# Patient Record
Sex: MALE | Race: WHITE | NOT HISPANIC OR LATINO | Employment: OTHER | URBAN - METROPOLITAN AREA
[De-identification: names, ages, dates, MRNs, and addresses within clinical notes are randomized per-mention and may not be internally consistent; named-entity substitution may affect disease eponyms.]

---

## 2024-03-06 DIAGNOSIS — M25.559 HIP PAIN, UNSPECIFIED LATERALITY: Primary | ICD-10-CM

## 2024-03-07 ENCOUNTER — OFFICE VISIT (OUTPATIENT)
Dept: ORTHOPEDICS | Facility: CLINIC | Age: 84
End: 2024-03-07
Payer: MEDICARE

## 2024-03-07 VITALS — WEIGHT: 202 LBS | BODY MASS INDEX: 31.71 KG/M2 | HEIGHT: 67 IN

## 2024-03-07 DIAGNOSIS — M16.11 PRIMARY OSTEOARTHRITIS OF RIGHT HIP: Primary | ICD-10-CM

## 2024-03-07 DIAGNOSIS — M16.12 PRIMARY OSTEOARTHRITIS OF LEFT HIP: ICD-10-CM

## 2024-03-07 DIAGNOSIS — M16.11 PRIMARY LOCALIZED OSTEOARTHRITIS OF RIGHT HIP: Primary | ICD-10-CM

## 2024-03-07 PROCEDURE — 99999 PR PBB SHADOW E&M-EST. PATIENT-LVL III: CPT | Mod: PBBFAC,,, | Performed by: ORTHOPAEDIC SURGERY

## 2024-03-07 PROCEDURE — 1125F AMNT PAIN NOTED PAIN PRSNT: CPT | Mod: CPTII,S$GLB,, | Performed by: ORTHOPAEDIC SURGERY

## 2024-03-07 PROCEDURE — 99204 OFFICE O/P NEW MOD 45 MIN: CPT | Mod: S$GLB,,, | Performed by: ORTHOPAEDIC SURGERY

## 2024-03-07 PROCEDURE — 1159F MED LIST DOCD IN RCRD: CPT | Mod: CPTII,S$GLB,, | Performed by: ORTHOPAEDIC SURGERY

## 2024-03-07 NOTE — PROGRESS NOTES
NEW PATIENT ORTHOPAEDIC: HIP    PRIMARY CARE PHYSICIAN: Kalin Cobos MD   REFERRING PROVIDER: Self, Aaareferral  No address on file     ASSESSMENT & PLAN:    Impression:  Bilateral Hip Severe Degenerative Osteoarthritis, Primary (right worse than left)    Follow Up Plan: 3 weeks after surgery     Surgery:     Michael Lennon has radiograph and physical exam evidence of the aforementioned impression and wishes to pursue surgery. This patient appears to have sufficient symptoms to warrant surgical intervention and is an appropriate candidate for right Primary Total Hip Arthroplasty as evidenced by months of unsuccessful non-operative treatment as outlined in the HPI below and progressive symptoms.    We had a lengthy discussion regarding the risk and benefit of surgery, the alternatives, limitations and personnel involved. These included but were not limited to infection, persistent pain, instability, nerve injury, blood clots, dislocation, loosening, leg length inequality and medical complications. We also discussed the pre-operative course, surgery itself and rehabilitation.    Keyla-operative blood management and transfusion issues were discussed, and options clearly outlined. The patient has consented to the use of the banked allogenic blood if medically necessary.    The patient has elected to schedule surgery at this time or intends to call the office with a surgical date. Shared decision making occurred while obtaining informed consent. The patient will be scheduled for a pre-operative education class at which time they will have their nasal swab completed and will be given CHG cloths along with the verbal and written instructions for their use.    We will plan for use of Jf Accolade 2/Trident 2 components, with JONEL assistance.     Patient is on Eloquis for Afib.     Tentative Surgery Date: 5/1/24      The patient has been ordered:  CT ([facility] Protocol)    CONSULTS:   Internal Medicine Consult for  preoperative clearance.  Cardiology Consult for cardiac clearance for cardiac problem.  Anesthesia for pre-surgical optimization    ACTIVE PROBLEM LIST  There is no problem list on file for this patient.          SUBJECTIVE    CHIEF COMPLAINT: Hip Pain    HPI:   Michael Lennon is a 83 y.o. male here for evaluation and management of bilateral hip pain. There is not a specific incident that brought about this pain. he has had progressive problems with the hip(s) starting 10 years ago and is progressing which is now interfering with activities which include: walking, functional household ADL's, participating in family activities, enjoying hobbies, rising from a sitting position, standing for prolonged periods of time, and getting in and out of a car    Currently the pain in the joint is moderate with activity.  The pain is intermittent and is located in groin.  The pain is described as aching, severe, sharp, stiffness, and throbbing. Relieving factors include rest, ice or heat, over the counter medication , and repositioning. Yes associated Catching.     They do not report leg length inequality.     Michael Lennon has no additional complaints.     PROGRESSIVE SYMPTOMS:  Pain worsened by weight bearing  Pain effecting living situation  Pain limiting ability to stay fit and healthy    FUNCTIONAL STATUS:   Climb a flight of stairs or walk up a hill     PREVIOUS TREATMENTS:  Medical: OTC NSAIDS and Tylenol  Physical Therapy: Activities Modified   Previous Orthopaedic Surgery: b/l TKA    REVIEW OF SYSTEMS:  PAIN ASSESSMENT:  See HPI.  MUSCULOSKELETAL: See HPI.  OTHER 10 point review of systems is negative except as stated in HPI above    PAST MEDICAL HISTORY   has a past medical history of Hypertension.     PAST SURGICAL HISTORY   has a past surgical history that includes Knee surgery (2003); Knee surgery (2010); Appendectomy; and Disc removal (1970).     FAMILY HISTORY  family history is not on file.     SOCIAL HISTORY   reports  "that he has quit smoking. His smoking use included cigarettes. He started smoking about 48 years ago. He has never used smokeless tobacco. He reports current alcohol use. He reports that he does not use drugs.     ALLERGIES   Review of patient's allergies indicates:  No Known Allergies     MEDICATIONS   Current Outpatient Medications on File Prior to Visit   Medication Sig Dispense Refill    allopurinol (ZYLOPRIM) 100 MG tablet       amlodipine (NORVASC) 10 MG tablet       COLCRYS 0.6 mg tablet       ketoconazole (NIZORAL) 2 % shampoo       lisinopril (PRINIVIL,ZESTRIL) 40 MG tablet       metoprolol succinate (TOPROL-XL) 25 MG 24 hr tablet        No current facility-administered medications on file prior to visit.          PHYSICAL EXAM   height is 5' 7" (1.702 m) and weight is 91.6 kg (202 lb).   Body mass index is 31.64 kg/m².      All other systems deferred.  GENERAL:  No acute distress  HABITUS: Obese  GAIT: Antalgic  SKIN: Normal     HIP EXAM:    bilateral:   ROM:   Flexion: 120 degrees    Internal Rotation: 10 degrees    External Rotation: 20 degrees    Abduction: 45 degrees    Adduction: 20 degrees  No apparent leg length discrepancy    Palpation: No tenderness over greater trochanter, SI joint, ilioposas, IT band, gluteal musculature   Pain is reproduced with IR and ER of the hip  Strength: 5/5 hip flexion, abduction, knee flexion and extension   Straight leg raise: Negative   Neurovascular Status: Sensation intact to light touch in Sural, saphenous, SPN, DPN, Tibial nerve distribution  2+ pulse DP/PT, normal capillary refill, foot has normal warmth    DATA:  Diagnostic tests reviewed for today's visit:     AP pelvis, hip, and lateral radiographs shows bilateral severe narrowing of the superior joint space with sclerosis and osteophyte formation. The femoral neck is in varus position. The femoral head is flattened at superior margin. There is no signficant evidence of acetabular dysplasia and the femoral " head remains covered.

## 2024-04-08 ENCOUNTER — HOSPITAL ENCOUNTER (OUTPATIENT)
Dept: RADIOLOGY | Facility: HOSPITAL | Age: 84
Discharge: HOME | End: 2024-04-08
Attending: ORTHOPAEDIC SURGERY
Payer: MEDICARE

## 2024-04-08 ENCOUNTER — ANESTHESIA EVENT (OUTPATIENT)
Dept: SURGERY | Facility: HOSPITAL | Age: 84
End: 2024-04-08
Payer: MEDICARE

## 2024-04-08 DIAGNOSIS — M16.11 PRIMARY OSTEOARTHRITIS OF RIGHT HIP: ICD-10-CM

## 2024-04-08 PROCEDURE — 73700 CT LOWER EXTREMITY W/O DYE: CPT | Mod: 26,RT,, | Performed by: RADIOLOGY

## 2024-04-08 PROCEDURE — 73700 CT LOWER EXTREMITY W/O DYE: CPT | Mod: TC,RT

## 2024-04-17 ENCOUNTER — HOSPITAL ENCOUNTER (OUTPATIENT)
Dept: PREADMISSION TESTING | Facility: HOSPITAL | Age: 84
Discharge: HOME | End: 2024-04-17
Attending: ORTHOPAEDIC SURGERY
Payer: MEDICARE

## 2024-04-17 ENCOUNTER — HOSPITAL ENCOUNTER (OUTPATIENT)
Dept: RADIOLOGY | Facility: HOSPITAL | Age: 84
Discharge: HOME | End: 2024-04-17
Attending: ORTHOPAEDIC SURGERY
Payer: MEDICARE

## 2024-04-17 VITALS
HEIGHT: 67 IN | TEMPERATURE: 97 F | SYSTOLIC BLOOD PRESSURE: 123 MMHG | WEIGHT: 213.38 LBS | DIASTOLIC BLOOD PRESSURE: 60 MMHG | OXYGEN SATURATION: 98 % | HEART RATE: 60 BPM | BODY MASS INDEX: 33.49 KG/M2 | RESPIRATION RATE: 18 BRPM

## 2024-04-17 DIAGNOSIS — Z01.818 PREOPERATIVE TESTING: Primary | ICD-10-CM

## 2024-04-17 DIAGNOSIS — Z79.01 ANTICOAGULANT LONG-TERM USE: ICD-10-CM

## 2024-04-17 DIAGNOSIS — Z86.39 H/O: OBESITY: ICD-10-CM

## 2024-04-17 LAB
APTT PPP: 27.2 SEC (ref 21–32)
BILIRUB UR QL STRIP: NEGATIVE
CLARITY UR: CLEAR
COLOR UR: YELLOW
ESTIMATED AVG GLUCOSE: 117 MG/DL (ref 68–131)
GLUCOSE UR QL STRIP: NEGATIVE
HBA1C MFR BLD: 5.7 % (ref 4–5.6)
HGB UR QL STRIP: NEGATIVE
INR PPP: 1 (ref 0.8–1.2)
KETONES UR QL STRIP: NEGATIVE
LEUKOCYTE ESTERASE UR QL STRIP: NEGATIVE
NITRITE UR QL STRIP: NEGATIVE
PH UR STRIP: 6 [PH] (ref 5–8)
PROT UR QL STRIP: NEGATIVE
PROTHROMBIN TIME: 10.8 SEC (ref 9–12.5)
SP GR UR STRIP: 1.01 (ref 1–1.03)
URN SPEC COLLECT METH UR: NORMAL
UROBILINOGEN UR STRIP-ACNC: NEGATIVE EU/DL

## 2024-04-17 PROCEDURE — 36415 COLL VENOUS BLD VENIPUNCTURE: CPT | Performed by: ORTHOPAEDIC SURGERY

## 2024-04-17 PROCEDURE — 81003 URINALYSIS AUTO W/O SCOPE: CPT | Performed by: ORTHOPAEDIC SURGERY

## 2024-04-17 PROCEDURE — 83036 HEMOGLOBIN GLYCOSYLATED A1C: CPT | Performed by: ORTHOPAEDIC SURGERY

## 2024-04-17 PROCEDURE — 85730 THROMBOPLASTIN TIME PARTIAL: CPT | Performed by: ORTHOPAEDIC SURGERY

## 2024-04-17 PROCEDURE — 71046 X-RAY EXAM CHEST 2 VIEWS: CPT | Mod: 26,,, | Performed by: RADIOLOGY

## 2024-04-17 PROCEDURE — 85610 PROTHROMBIN TIME: CPT | Performed by: ORTHOPAEDIC SURGERY

## 2024-04-17 PROCEDURE — 71046 X-RAY EXAM CHEST 2 VIEWS: CPT | Mod: TC,FY

## 2024-04-17 RX ORDER — POTASSIUM CHLORIDE 750 MG/1
10 CAPSULE, EXTENDED RELEASE ORAL DAILY
COMMUNITY

## 2024-04-17 RX ORDER — COLCHICINE 0.6 MG/1
0.6 CAPSULE ORAL EVERY OTHER DAY
COMMUNITY
Start: 2024-04-06

## 2024-04-17 RX ORDER — CLONIDINE HYDROCHLORIDE 0.3 MG/1
0.3 TABLET ORAL 2 TIMES DAILY
COMMUNITY
Start: 2024-04-09

## 2024-04-17 RX ORDER — METFORMIN HYDROCHLORIDE 500 MG/1
500 TABLET ORAL NIGHTLY
COMMUNITY

## 2024-04-17 RX ORDER — APIXABAN 5 MG/1
5 TABLET, FILM COATED ORAL 2 TIMES DAILY
Status: ON HOLD | COMMUNITY
End: 2024-05-01 | Stop reason: HOSPADM

## 2024-04-17 RX ORDER — HYDROCHLOROTHIAZIDE 12.5 MG/1
12.5 CAPSULE ORAL DAILY
COMMUNITY
Start: 2024-04-05

## 2024-04-17 RX ORDER — FUROSEMIDE 20 MG/1
20 TABLET ORAL DAILY
COMMUNITY

## 2024-04-17 NOTE — DISCHARGE INSTRUCTIONS
YOUR PROCEDURE WILL BE AT [facility] at [address] [place] [address]                  Before 7 AM, enter through the Emergency Entrance..   After 7 AM enter through the Main Entrance.                 Report to the Same Day Surgery Registration Desk in the hallway.(Just beside the Same Day Surgery Unit)      Your procedure  is scheduled for _5/1/2024_________.    Call [phone] between 2pm and 5pm on _4/30/2024______to find out your arrival time for the day of surgery.    You may have two visitors.  No children under 12 years old.     You will be going to the Same Day Surgery Unit on the 2nd floor of the hospital.    Important instructions:  Do not eat anything after midnight.  You may have plain water, non carbonated.  You may also have Gatorade or Powerade after midnight.    Stop all fluids 2 hours before your surgery.    It is okay to brush your teeth.  Do not have gum, candy or mints.    SEE MEDICATION SHEET.   TAKE MEDICATIONS AS DIRECTED WITH SIPS OF WATER.      Do not take any diabetic medication on the morning of surgery unless instructed to do so by your doctor or pre op nurse.      All GLP-1 weekly diabetic/weight loss medications must not be taken for one week before your surgery, or your surgery could be canceled.      STOP taking Aspirin, Ibuprofen,  Advil, Motrin, Mobic(meloxicam), Aleve (naproxen), Fish oil, and Vitamin E for at least 7 days before your surgery.     You may take Tylenol if needed which is not a blood thinner.    Please shower the night before and the morning of your surgery.        Use Chlorhexidine soap as instructed by your pre op nurse.   Please place clean linens on your bed the night before surgery. Please wear fresh clean clothing after each shower.    No shaving of procedural area at least 4-5 days before surgery due to increased risk of skin irritation and/or possible infection.    Contact lenses and removable denture work may not be  worn during your procedure.    You may wear deodorant only. If you are having breast surgery, do not wear deodorant on the operative side.    Do not wear powder, body lotion, perfume/cologne or make-up.    Do not wear any jewelry or have any metal on your body.    You will be asked to remove any dentures or partials for the procedure.    If you are going home on the same day of surgery, you must arrange for a family member or a friend to drive you home.  Public transportation is prohibited.  You will not be able to drive home if you were given anesthesia or sedation.    Patients who want to have their Post-op prescriptions filled from our in-house [facility], bring a Credit/Debit Card or cash with you. A co-pay may be required.  The pharmacy closes at 5:30 pm.    Wear loose fitting clothes allowing for bandages.    Please leave money and valuables home.      You may bring your cell phone.    Call the doctor if fever or illness should occur before your surgery.    Call [phone] to contact us here if needed.                            CLOTHES ON DAY OF SURGERY    SHOULDER surgery:  you must have a very oversized shirt.  Very, Very large.  You will probably have a large sling on with your arm strapped to your chest.  You will not be able to put the arm of the operated shoulder into a sleeve.  You can put the arm of the un-operated shoulder into the sleeve, but the shirt will need to be draped over the operated shoulder.       ARM or HAND surgery:  make sure that your sleeves are large and loose enough to pass over large dressings or cast.      BREAST or UNDERARM surgery:  wear a loose, button down shirt so that you can dress without raising your arms over your head.    ABDOMINAL surgery:  wear loose, comfortable clothing.  Nothing tight around the abdomen.  NO JEANS    PENIS or SCROTAL surgery:  loose comfortable clothing.  Large sweat pants, pajama pants or a robe.  ABSOLUTELY NO JEANS      LEG or FOOT surgery:   wear large loose pants that are able to pass over any large dressings or casts.  You could also wear loose shorts or a skirt.

## 2024-04-17 NOTE — ANESTHESIA PREPROCEDURE EVALUATION
04/17/2024  Michael Lennon is a 83 y.o., male scheduled for ARTHROPLASTY, HIP, TOTAL, USING COMPUTER-ASSISTED NAVIGATION (Right: Hip) on 5/1/2024.      Past Medical History:   Diagnosis Date    Anticoagulant long-term use     Cancer     skin on face    Diabetes mellitus     Hypercholesteremia     Hypertension     Paroxysmal atrial fibrillation          Past Surgical History:   Procedure Laterality Date    APPENDECTOMY      DISC REMOVAL  1970    KNEE SURGERY  2003    left    KNEE SURGERY  2010    right           Pre-op Assessment    I have reviewed the Patient Summary Reports.     I have reviewed the Nursing Notes. I have reviewed the NPO Status.   I have reviewed the Medications.     Review of Systems  Anesthesia Hx:  No problems with previous Anesthesia             Denies Family Hx of Anesthesia complications.    Denies Personal Hx of Anesthesia complications.                    Social:  Former Smoker, Social Alcohol Use       Hematology/Oncology:  Hematology Normal   Oncology Normal                                   EENT/Dental:  EENT/Dental Normal           Cardiovascular:  Exercise tolerance: good   Hypertension    Dysrhythmias atrial fibrillation          EKG 1/30/24 (Care Everywhere):VENTRICULAR RATE BPM 67  ATRIAL RATE BPM 67  P-R INTERVAL ms 300  QRS DURATION ms 134  Q-T INTERVAL ms 458  QTC CALCULATION(BEZET) ms 483  P AXIS degrees 64  R AXIS degrees 48  T AXIS degrees 9  INTERPRETATION (MUSE)  Sinus rhythm with 1st degree AV block Right bundle branch block Abnormal ECG When compared with ECG of 24-MAY-2023 06:14, Significant changes have occurred Confirmed by DANYELLE MCCABE, HERRERA NEVES ([id number]) on 1/31/2024 2:50:18 PM     Functional Capacity good / => 4 METS                         Pulmonary:  Pulmonary Normal                       Renal/:  Renal/ Normal                 Hepatic/GI:  Hepatic/GI Normal                  Musculoskeletal:  Arthritis   R hip            Neurological:  Neurology Normal                                      Endocrine:  Diabetes, well controlled, type 2           Dermatological:  Skin Normal    Psych:  Psychiatric Normal                    Physical Exam  General: Well nourished, Cooperative, Alert and Oriented    Airway:  Mallampati: II / II  Mouth Opening: Normal  TM Distance: Normal  Tongue: Normal  Neck ROM: Normal ROM    Dental:  Intact    Chest/Lungs:  Clear to auscultation, Normal Respiratory Rate    Heart:  Rate: Normal  Rhythm: Regular Rhythm  Sounds: Normal        Anesthesia Plan  Type of Anesthesia, risks & benefits discussed:    Anesthesia Type: Spinal, Regional, Gen Supraglottic Airway, Gen ETT  Intra-op Monitoring Plan: Standard ASA Monitors  Induction:  IV  Airway Plan: Video and Direct, Post-Induction  Informed Consent: Informed consent signed with the Patient and all parties understand the risks and agree with anesthesia plan.  All questions answered.   ASA Score: 3  Day of Surgery Review of History & Physical: H&P Update referred to the surgeon/provider.  Anesthesia Plan Notes: Patient is medically cleared by PCP  Cleared by Cardiology (media)    Ready For Surgery From Anesthesia Perspective.     .

## 2024-04-30 ENCOUNTER — TELEPHONE (OUTPATIENT)
Dept: SURGERY | Facility: HOSPITAL | Age: 84
End: 2024-04-30
Payer: MEDICARE

## 2024-04-30 ENCOUNTER — LAB VISIT (OUTPATIENT)
Dept: LAB | Facility: HOSPITAL | Age: 84
End: 2024-04-30
Attending: ORTHOPAEDIC SURGERY
Payer: MEDICARE

## 2024-04-30 DIAGNOSIS — Z01.818 PREOPERATIVE TESTING: ICD-10-CM

## 2024-04-30 LAB
ABO + RH BLD: NORMAL
BLD GP AB SCN CELLS X3 SERPL QL: NORMAL
SPECIMEN OUTDATE: NORMAL

## 2024-04-30 PROCEDURE — 36415 COLL VENOUS BLD VENIPUNCTURE: CPT | Performed by: ORTHOPAEDIC SURGERY

## 2024-04-30 PROCEDURE — 86901 BLOOD TYPING SEROLOGIC RH(D): CPT | Performed by: ORTHOPAEDIC SURGERY

## 2024-05-01 ENCOUNTER — HOSPITAL ENCOUNTER (OUTPATIENT)
Facility: HOSPITAL | Age: 84
Discharge: HOME HEALTH | End: 2024-05-02
Attending: ORTHOPAEDIC SURGERY | Admitting: ORTHOPAEDIC SURGERY
Payer: MEDICARE

## 2024-05-01 ENCOUNTER — ANESTHESIA (OUTPATIENT)
Dept: SURGERY | Facility: HOSPITAL | Age: 84
End: 2024-05-01
Payer: MEDICARE

## 2024-05-01 DIAGNOSIS — M16.11 OSTEOARTHRITIS OF RIGHT HIP: ICD-10-CM

## 2024-05-01 DIAGNOSIS — Z01.818 PREOPERATIVE TESTING: ICD-10-CM

## 2024-05-01 DIAGNOSIS — M16.11 PRIMARY OSTEOARTHRITIS OF RIGHT HIP: Primary | ICD-10-CM

## 2024-05-01 LAB — POCT GLUCOSE: 145 MG/DL (ref 70–110)

## 2024-05-01 PROCEDURE — C1713 ANCHOR/SCREW BN/BN,TIS/BN: HCPCS | Performed by: ORTHOPAEDIC SURGERY

## 2024-05-01 PROCEDURE — 63600175 PHARM REV CODE 636 W HCPCS: Performed by: ANESTHESIOLOGY

## 2024-05-01 PROCEDURE — 25000003 PHARM REV CODE 250: Performed by: ORTHOPAEDIC SURGERY

## 2024-05-01 PROCEDURE — 27201423 OPTIME MED/SURG SUP & DEVICES STERILE SUPPLY: Performed by: ORTHOPAEDIC SURGERY

## 2024-05-01 PROCEDURE — C1776 JOINT DEVICE (IMPLANTABLE): HCPCS | Performed by: ORTHOPAEDIC SURGERY

## 2024-05-01 PROCEDURE — 63600175 PHARM REV CODE 636 W HCPCS: Performed by: ORTHOPAEDIC SURGERY

## 2024-05-01 PROCEDURE — 25000003 PHARM REV CODE 250: Performed by: STUDENT IN AN ORGANIZED HEALTH CARE EDUCATION/TRAINING PROGRAM

## 2024-05-01 PROCEDURE — 0055T BONE SRGRY CMPTR CT/MRI IMAG: CPT | Mod: ,,, | Performed by: ORTHOPAEDIC SURGERY

## 2024-05-01 PROCEDURE — 36000712 HC OR TIME LEV V 1ST 15 MIN: Performed by: ORTHOPAEDIC SURGERY

## 2024-05-01 PROCEDURE — 71000033 HC RECOVERY, INTIAL HOUR: Performed by: ORTHOPAEDIC SURGERY

## 2024-05-01 PROCEDURE — 63600175 PHARM REV CODE 636 W HCPCS: Performed by: STUDENT IN AN ORGANIZED HEALTH CARE EDUCATION/TRAINING PROGRAM

## 2024-05-01 PROCEDURE — 36000713 HC OR TIME LEV V EA ADD 15 MIN: Performed by: ORTHOPAEDIC SURGERY

## 2024-05-01 PROCEDURE — 25000003 PHARM REV CODE 250: Performed by: ANESTHESIOLOGY

## 2024-05-01 PROCEDURE — 27130 TOTAL HIP ARTHROPLASTY: CPT | Mod: RT,,, | Performed by: ORTHOPAEDIC SURGERY

## 2024-05-01 PROCEDURE — 37000009 HC ANESTHESIA EA ADD 15 MINS: Performed by: ORTHOPAEDIC SURGERY

## 2024-05-01 PROCEDURE — D9220A PRA ANESTHESIA: Mod: CRNA,,, | Performed by: STUDENT IN AN ORGANIZED HEALTH CARE EDUCATION/TRAINING PROGRAM

## 2024-05-01 PROCEDURE — 71000039 HC RECOVERY, EACH ADD'L HOUR: Performed by: ORTHOPAEDIC SURGERY

## 2024-05-01 PROCEDURE — D9220A PRA ANESTHESIA: Mod: ANES,,, | Performed by: ANESTHESIOLOGY

## 2024-05-01 PROCEDURE — 82962 GLUCOSE BLOOD TEST: CPT | Performed by: ORTHOPAEDIC SURGERY

## 2024-05-01 PROCEDURE — 37000008 HC ANESTHESIA 1ST 15 MINUTES: Performed by: ORTHOPAEDIC SURGERY

## 2024-05-01 DEVICE — SCREW TRIDENT II LP HEX 6.5X25: Type: IMPLANTABLE DEVICE | Site: HIP | Status: FUNCTIONAL

## 2024-05-01 DEVICE — INSERT TRIDENT X3 ID 36MM 0 DE: Type: IMPLANTABLE DEVICE | Site: HIP | Status: FUNCTIONAL

## 2024-05-01 DEVICE — HEAD V40 BIOLOX 36MM -2.5: Type: IMPLANTABLE DEVICE | Site: HIP | Status: FUNCTIONAL

## 2024-05-01 DEVICE — SHELL TRIDENT II ACET E 54MM: Type: IMPLANTABLE DEVICE | Site: HIP | Status: FUNCTIONAL

## 2024-05-01 DEVICE — STEM ACC II 27 DEG SZ 5: Type: IMPLANTABLE DEVICE | Site: HIP | Status: FUNCTIONAL

## 2024-05-01 RX ORDER — SODIUM CHLORIDE, SODIUM LACTATE, POTASSIUM CHLORIDE, CALCIUM CHLORIDE 600; 310; 30; 20 MG/100ML; MG/100ML; MG/100ML; MG/100ML
INJECTION, SOLUTION INTRAVENOUS CONTINUOUS
Status: DISCONTINUED | OUTPATIENT
Start: 2024-05-01 | End: 2024-05-02 | Stop reason: HOSPADM

## 2024-05-01 RX ORDER — HYDROMORPHONE HYDROCHLORIDE 2 MG/ML
0.2 INJECTION, SOLUTION INTRAMUSCULAR; INTRAVENOUS; SUBCUTANEOUS EVERY 5 MIN PRN
Status: DISCONTINUED | OUTPATIENT
Start: 2024-05-01 | End: 2024-05-01 | Stop reason: HOSPADM

## 2024-05-01 RX ORDER — DOCUSATE SODIUM 100 MG/1
100 CAPSULE, LIQUID FILLED ORAL 2 TIMES DAILY
Qty: 30 CAPSULE | Refills: 0 | Status: SHIPPED | OUTPATIENT
Start: 2024-05-01

## 2024-05-01 RX ORDER — DIPHENHYDRAMINE HYDROCHLORIDE 50 MG/ML
25 INJECTION INTRAMUSCULAR; INTRAVENOUS EVERY 6 HOURS PRN
Status: DISCONTINUED | OUTPATIENT
Start: 2024-05-01 | End: 2024-05-01 | Stop reason: HOSPADM

## 2024-05-01 RX ORDER — ACETAMINOPHEN 500 MG
1000 TABLET ORAL
Status: DISPENSED | OUTPATIENT
Start: 2024-05-01

## 2024-05-01 RX ORDER — FENTANYL CITRATE 50 UG/ML
INJECTION, SOLUTION INTRAMUSCULAR; INTRAVENOUS
Status: DISCONTINUED | OUTPATIENT
Start: 2024-05-01 | End: 2024-05-01

## 2024-05-01 RX ORDER — ASPIRIN 81 MG/1
81 TABLET ORAL 2 TIMES DAILY
Status: DISPENSED | OUTPATIENT
Start: 2024-05-01

## 2024-05-01 RX ORDER — ACETAMINOPHEN 500 MG
1000 TABLET ORAL EVERY 8 HOURS PRN
Qty: 42 TABLET | Refills: 0 | Status: SHIPPED | OUTPATIENT
Start: 2024-05-01

## 2024-05-01 RX ORDER — TRANEXAMIC ACID 10 MG/ML
1000 INJECTION, SOLUTION INTRAVENOUS
Status: COMPLETED | OUTPATIENT
Start: 2024-05-01 | End: 2024-05-01

## 2024-05-01 RX ORDER — AMOXICILLIN 250 MG
1 CAPSULE ORAL 2 TIMES DAILY
Status: DISPENSED | OUTPATIENT
Start: 2024-05-01

## 2024-05-01 RX ORDER — OXYCODONE HYDROCHLORIDE 5 MG/1
10 TABLET ORAL
Status: ACTIVE | OUTPATIENT
Start: 2024-05-01

## 2024-05-01 RX ORDER — LIDOCAINE HYDROCHLORIDE 20 MG/ML
INJECTION INTRAVENOUS
Status: DISCONTINUED | OUTPATIENT
Start: 2024-05-01 | End: 2024-05-01

## 2024-05-01 RX ORDER — LIDOCAINE HYDROCHLORIDE 10 MG/ML
1 INJECTION, SOLUTION EPIDURAL; INFILTRATION; INTRACAUDAL; PERINEURAL ONCE
Status: DISCONTINUED | OUTPATIENT
Start: 2024-05-01 | End: 2024-05-01 | Stop reason: HOSPADM

## 2024-05-01 RX ORDER — SODIUM CHLORIDE 9 MG/ML
INJECTION, SOLUTION INTRAVENOUS CONTINUOUS
Status: ACTIVE | OUTPATIENT
Start: 2024-05-01 | End: 2024-05-02

## 2024-05-01 RX ORDER — PROPOFOL 10 MG/ML
VIAL (ML) INTRAVENOUS CONTINUOUS PRN
Status: DISCONTINUED | OUTPATIENT
Start: 2024-05-01 | End: 2024-05-01

## 2024-05-01 RX ORDER — ONDANSETRON HYDROCHLORIDE 2 MG/ML
4 INJECTION, SOLUTION INTRAVENOUS EVERY 8 HOURS PRN
Status: ACTIVE | OUTPATIENT
Start: 2024-05-01

## 2024-05-01 RX ORDER — SODIUM CHLORIDE 9 MG/ML
INJECTION, SOLUTION INTRAVENOUS
Status: ACTIVE | OUTPATIENT
Start: 2024-05-01

## 2024-05-01 RX ORDER — PROCHLORPERAZINE EDISYLATE 5 MG/ML
5 INJECTION INTRAMUSCULAR; INTRAVENOUS EVERY 6 HOURS PRN
Status: ACTIVE | OUTPATIENT
Start: 2024-05-01

## 2024-05-01 RX ORDER — ONDANSETRON HYDROCHLORIDE 2 MG/ML
INJECTION, SOLUTION INTRAVENOUS
Status: DISCONTINUED | OUTPATIENT
Start: 2024-05-01 | End: 2024-05-01

## 2024-05-01 RX ORDER — OXYCODONE HYDROCHLORIDE 5 MG/1
5-10 TABLET ORAL EVERY 4 HOURS PRN
Qty: 40 TABLET | Refills: 0 | Status: SHIPPED | OUTPATIENT
Start: 2024-05-01

## 2024-05-01 RX ORDER — DEXMEDETOMIDINE HYDROCHLORIDE 100 UG/ML
INJECTION, SOLUTION INTRAVENOUS
Status: DISCONTINUED | OUTPATIENT
Start: 2024-05-01 | End: 2024-05-01

## 2024-05-01 RX ORDER — NALOXONE HCL 0.4 MG/ML
0.02 VIAL (ML) INJECTION
Status: ACTIVE | OUTPATIENT
Start: 2024-05-01

## 2024-05-01 RX ORDER — ACETAMINOPHEN 500 MG
1000 TABLET ORAL
Status: COMPLETED | OUTPATIENT
Start: 2024-05-01 | End: 2024-05-01

## 2024-05-01 RX ORDER — FENTANYL CITRATE 50 UG/ML
25 INJECTION, SOLUTION INTRAMUSCULAR; INTRAVENOUS EVERY 5 MIN PRN
Status: ACTIVE | OUTPATIENT
Start: 2024-05-01

## 2024-05-01 RX ORDER — CELECOXIB 100 MG/1
400 CAPSULE ORAL ONCE
Status: COMPLETED | OUTPATIENT
Start: 2024-05-01 | End: 2024-05-01

## 2024-05-01 RX ORDER — OXYCODONE HYDROCHLORIDE 5 MG/1
5 TABLET ORAL
Status: DISPENSED | OUTPATIENT
Start: 2024-05-01

## 2024-05-01 RX ORDER — FAMOTIDINE 20 MG/1
20 TABLET, FILM COATED ORAL 2 TIMES DAILY
Status: DISPENSED | OUTPATIENT
Start: 2024-05-01

## 2024-05-01 RX ORDER — MUPIROCIN 20 MG/G
1 OINTMENT TOPICAL
Status: COMPLETED | OUTPATIENT
Start: 2024-05-01 | End: 2024-05-01

## 2024-05-01 RX ORDER — BUPIVACAINE HYDROCHLORIDE 7.5 MG/ML
INJECTION, SOLUTION INTRASPINAL
Status: DISCONTINUED | OUTPATIENT
Start: 2024-05-01 | End: 2024-05-01

## 2024-05-01 RX ORDER — ROPIVACAINE/EPI/CLONIDINE/KET 2.46-0.005
SYRINGE (ML) INJECTION
Status: DISCONTINUED | OUTPATIENT
Start: 2024-05-01 | End: 2024-05-01 | Stop reason: HOSPADM

## 2024-05-01 RX ORDER — LIDOCAINE HYDROCHLORIDE 10 MG/ML
1 INJECTION, SOLUTION EPIDURAL; INFILTRATION; INTRACAUDAL; PERINEURAL
Status: ACTIVE | OUTPATIENT
Start: 2024-05-01

## 2024-05-01 RX ORDER — ACETAMINOPHEN 500 MG
1000 TABLET ORAL EVERY 6 HOURS
Status: DISPENSED | OUTPATIENT
Start: 2024-05-01

## 2024-05-01 RX ORDER — LORAZEPAM 2 MG/ML
0.25 INJECTION INTRAMUSCULAR ONCE AS NEEDED
Status: DISCONTINUED | OUTPATIENT
Start: 2024-05-01 | End: 2024-05-01 | Stop reason: HOSPADM

## 2024-05-01 RX ORDER — MORPHINE SULFATE 4 MG/ML
2 INJECTION, SOLUTION INTRAMUSCULAR; INTRAVENOUS
Status: ACTIVE | OUTPATIENT
Start: 2024-05-01

## 2024-05-01 RX ORDER — METHOCARBAMOL 500 MG/1
500 TABLET, FILM COATED ORAL 4 TIMES DAILY
Qty: 40 TABLET | Refills: 0 | Status: SHIPPED | OUTPATIENT
Start: 2024-05-01 | End: 2024-05-12

## 2024-05-01 RX ORDER — MEPERIDINE HYDROCHLORIDE 50 MG/ML
12.5 INJECTION INTRAMUSCULAR; INTRAVENOUS; SUBCUTANEOUS EVERY 10 MIN PRN
Status: DISCONTINUED | OUTPATIENT
Start: 2024-05-01 | End: 2024-05-01 | Stop reason: HOSPADM

## 2024-05-01 RX ADMIN — FENTANYL CITRATE 25 MCG: 50 INJECTION, SOLUTION INTRAMUSCULAR; INTRAVENOUS at 03:05

## 2024-05-01 RX ADMIN — HYDROMORPHONE HYDROCHLORIDE 0.2 MG: 2 INJECTION INTRAMUSCULAR; INTRAVENOUS; SUBCUTANEOUS at 03:05

## 2024-05-01 RX ADMIN — DEXMEDETOMIDINE HYDROCHLORIDE 8 MCG: 100 INJECTION, SOLUTION INTRAVENOUS at 12:05

## 2024-05-01 RX ADMIN — ASPIRIN 81 MG: 81 TABLET, COATED ORAL at 09:05

## 2024-05-01 RX ADMIN — LIDOCAINE HYDROCHLORIDE 60 MG: 20 INJECTION, SOLUTION INTRAVENOUS at 12:05

## 2024-05-01 RX ADMIN — CELECOXIB 400 MG: 100 CAPSULE ORAL at 11:05

## 2024-05-01 RX ADMIN — FENTANYL CITRATE 25 MCG: 50 INJECTION, SOLUTION INTRAMUSCULAR; INTRAVENOUS at 02:05

## 2024-05-01 RX ADMIN — SODIUM CHLORIDE, POTASSIUM CHLORIDE, SODIUM LACTATE AND CALCIUM CHLORIDE: 600; 310; 30; 20 INJECTION, SOLUTION INTRAVENOUS at 11:05

## 2024-05-01 RX ADMIN — TRANEXAMIC ACID 1000 MG: 10 INJECTION, SOLUTION INTRAVENOUS at 02:05

## 2024-05-01 RX ADMIN — PROPOFOL 75 MCG/KG/MIN: 10 INJECTION, EMULSION INTRAVENOUS at 12:05

## 2024-05-01 RX ADMIN — CEFAZOLIN 2 G: 2 INJECTION, POWDER, FOR SOLUTION INTRAMUSCULAR; INTRAVENOUS at 10:05

## 2024-05-01 RX ADMIN — ACETAMINOPHEN 1000 MG: 500 TABLET ORAL at 11:05

## 2024-05-01 RX ADMIN — DEXMEDETOMIDINE HYDROCHLORIDE 4 MCG: 100 INJECTION, SOLUTION INTRAVENOUS at 02:05

## 2024-05-01 RX ADMIN — ONDANSETRON 4 MG: 2 INJECTION, SOLUTION INTRAMUSCULAR; INTRAVENOUS at 01:05

## 2024-05-01 RX ADMIN — FENTANYL CITRATE 25 MCG: 50 INJECTION, SOLUTION INTRAMUSCULAR; INTRAVENOUS at 01:05

## 2024-05-01 RX ADMIN — BUPIVACAINE HYDROCHLORIDE IN DEXTROSE 2 ML: 7.5 INJECTION, SOLUTION SUBARACHNOID at 12:05

## 2024-05-01 RX ADMIN — SODIUM CHLORIDE: 9 INJECTION, SOLUTION INTRAVENOUS at 10:05

## 2024-05-01 RX ADMIN — PHENYLEPHRINE HYDROCHLORIDE 15 MCG/MIN: 10 INJECTION INTRAVENOUS at 12:05

## 2024-05-01 RX ADMIN — SODIUM CHLORIDE, POTASSIUM CHLORIDE, SODIUM LACTATE AND CALCIUM CHLORIDE: 600; 310; 30; 20 INJECTION, SOLUTION INTRAVENOUS at 02:05

## 2024-05-01 RX ADMIN — SODIUM CHLORIDE: 9 INJECTION, SOLUTION INTRAVENOUS at 04:05

## 2024-05-01 RX ADMIN — PREGABALIN 75 MG: 25 CAPSULE ORAL at 09:05

## 2024-05-01 RX ADMIN — SENNOSIDES AND DOCUSATE SODIUM 1 TABLET: 50; 8.6 TABLET ORAL at 09:05

## 2024-05-01 RX ADMIN — ACETAMINOPHEN 1000 MG: 500 TABLET ORAL at 06:05

## 2024-05-01 RX ADMIN — MUPIROCIN 1 G: 20 OINTMENT TOPICAL at 11:05

## 2024-05-01 RX ADMIN — FENTANYL CITRATE 25 MCG: 50 INJECTION, SOLUTION INTRAMUSCULAR; INTRAVENOUS at 12:05

## 2024-05-01 RX ADMIN — GLYCOPYRROLATE 0.2 MG: 0.2 INJECTION, SOLUTION INTRAMUSCULAR; INTRAVITREAL at 02:05

## 2024-05-01 RX ADMIN — FENTANYL CITRATE 50 MCG: 50 INJECTION, SOLUTION INTRAMUSCULAR; INTRAVENOUS at 03:05

## 2024-05-01 RX ADMIN — GLYCOPYRROLATE 0.2 MG: 0.2 INJECTION, SOLUTION INTRAMUSCULAR; INTRAVITREAL at 01:05

## 2024-05-01 RX ADMIN — FAMOTIDINE 20 MG: 20 TABLET ORAL at 09:05

## 2024-05-01 RX ADMIN — TRANEXAMIC ACID 1000 MG: 10 INJECTION, SOLUTION INTRAVENOUS at 01:05

## 2024-05-01 RX ADMIN — DEXMEDETOMIDINE HYDROCHLORIDE 4 MCG: 100 INJECTION, SOLUTION INTRAVENOUS at 01:05

## 2024-05-01 RX ADMIN — OXYCODONE 5 MG: 5 TABLET ORAL at 04:05

## 2024-05-01 RX ADMIN — CEFAZOLIN 2 G: 2 INJECTION, POWDER, FOR SOLUTION INTRAMUSCULAR; INTRAVENOUS at 01:05

## 2024-05-01 RX ADMIN — PREGABALIN 75 MG: 25 CAPSULE ORAL at 11:05

## 2024-05-01 NOTE — ANESTHESIA PROCEDURE NOTES
Spinal    Diagnosis: osteoarthritis  Patient location during procedure: OR  Start time: 5/1/2024 12:45 PM  Timeout: 5/1/2024 12:44 PM  End time: 5/1/2024 12:46 PM    Staffing  Authorizing Provider: Jaycob Tijerina Chi, MD  Performing Provider: Alcides Carroll II, MD    Staffing  Performed by: Alcides Carroll II, MD  Authorized by: Jaycob Tijerina Chi, MD    Preanesthetic Checklist  Completed: patient identified, IV checked, site marked, risks and benefits discussed, surgical consent, monitors and equipment checked, pre-op evaluation and timeout performed  Spinal Block  Patient position: sitting  Prep: Betadine  Patient monitoring: heart rate, cardiac monitor and continuous pulse ox  Approach: midline  Location: L3-4  Injection technique: single shot  CSF Fluid: clear free-flowing CSF  Needle  Needle type: Avel   Needle gauge: 24 G  Needle length: 4 in  Additional Documentation: no paresthesia on injection and negative aspiration for heme  Needle localization: anatomical landmarks  Assessment  Ease of block: easy and moderate  Patient's tolerance of the procedure: comfortable throughout block and no complaints

## 2024-05-01 NOTE — DISCHARGE INSTRUCTIONS
Post-Operative Discharge Instructions: Dr. Clark    Physical Therapy  You will have home health/physical therapy working with you 2-3x a week for the first two weeks. After this, it will be necessary to begin formal outpatient physical therapy for an additional 2 weeks for hip replacements and about 4-6 weeks for knee replacements.   Knee replacements can get stiff and as such the post operative therapy is critical after a knee replacement. Range of motion exercises are not limited to therapy sessions and should be done every 1-2 hours on your own.   Hip replacements your therapy for the first month is mostly walking and gradually returning to activities as tolerated. However, you will have to wean off assistive devices and maintain hip precautions for 6 weeks post operatively.     Pain Management  Some degree of pain is normal and expected. You will improve gradually as your muscles become stronger and healing continues. This speed of recovery is different for every patient and you should not compare your recovery to another friend or relative.   You will be discharged with pain medications. You should wean off of these as tolerated by 4-6 weeks. but it is expected you will need them in the immediate post operative period and for therapy.   Pain medications can have common side effects of constipation which you should take a laxative, nausea which you should take medication with food, itching which can be alleviated with Benadryl, and confusion which you should stop taking pain medications and call our office if this occurs.   Be aware of your ingestion of Tylenol if your pain medication has this in it, you should not exceed 3000mg of Acetaminophen as this can damage your liver.   If you require a pain medication refill, you will need to contact our office at least 3 days in advance to prescription running out. Prescriptions cannot be called into a pharmacy. You will need to  a prescription in one of our  office locations depending on our clinic availability.     Swelling  You will have swelling of the post operative leg. Swelling may get worse with activity. It will likely get worse in the 2-3 days after discharge from the hospital. Typically swelling is correlated to pain. It can be helpful to elevate your extremity above the level of your heart and consistent use of ice. Elevating your extremity should not be done as to violate your hip precautions after a hip replacement, and no pillows should be placed under the knee after knee replacement surgery.   You will have compression stockings that should remain on for 3 weeks following surgery. They should only be removed for hygiene purposes. These will help with the swelling.       Anticoagulation  You will be placed on a blood thinner to prevent blood clots. You will need to take this as directed.        Wound Care  Your dressing should be changed after 7 days and every few days thereafter. You may shower over this dressing but it should be covered or kept dry (spike wrap or garbage bag). We will remove a portion of your bottom glue/gauze dressing at your follow up appointment. You can continue to shower but NO scrubbing the incision, should pat dry. NO soaking the wound in a bathtub, hot tub, pool, ocean etc. for at least 6 weeks after surgery. Your incision will likely be glued on the skin and no sutures should need to be removed post operatively.  You can begin putting on Vitamin E based lotions on the wound around 6-8 weeks post operatively when there is no remaining scabbing of the incision.   If there is any drainage post operatively you should contact our office immediately    Antibiotics  You will need to be placed on prophylactic antibiotics prior to any dental procedure or cleanings, any colonoscopy, cystoscopy, prostate or bladder surgery, kidney surgery or endoscopy. This will avoid risk of subsequent infection of your replacement. We prefer Amoxicillin  2g one hour prior to procedure. This can be given by our office or your dentist. Although controversial, we prefer lifetime dental prophylaxis.     Other Considerations  No additional anti-inflammatories should be used for 3 weeks following surgery  No driving for about 2-4 weeks following surgery on the left leg and about 4-6 weeks after surgery on the right leg. You will need to be off pain medications completely. You will need to be able to perform evasive driving maneuvers without hesitation.    You can fly about 2 weeks post-operatively. However, we may recommend alterative blood thinners if this will take place.   Return to work is patient specific. If a desk job, it may be 2-4 weeks for motivated individuals. Patients in strenuous or physical jobs may be out for 12 weeks.     Follow-up appointments  Your first post operative visit will be about 3 weeks after surgery. You will have x-rays at this appointment  Your second post operative visit will be about 3 months after surgery. This will be for a clinical check only unless a reason arises for an x-ray  Your third post operative visit will be about 1 year after surgery. X-rays are taken at this time.

## 2024-05-01 NOTE — PLAN OF CARE
Pre-op plan of care reviewed with patient and daughter. Admit assessment complete. Questions encouraged and answered. Post-op education begun with pt. Pt ready to proceed. Personal belongings placed on back of stretcher to follow patient to holding.

## 2024-05-01 NOTE — OP NOTE
OPERATIVE NOTE     PATIENT NAME: Michael Lennon   MRN: [id number]      Surgery Date: 5/1/24  Surgeon(s) and Assistant(s): Adarsh Clark MD. Malathi Keller CST      Procedure(s): right Primary Total Hip Arthroplasty     BMI:  There is no height or weight on file to calculate BMI.      Anesthesia:  Spinal        Attestation:   I was present for all of the critical portions of the operation and performed all critical portions.  The medical assistant performed the superficial closure under supervision, and assisted during the operation. I was immediately available for the duration of the entire case.      Preoperative Diagnosis:  right  Hip Arthritis     Postoperative Diagnosis: Same     Findings:  See Full Operative Report    Complications: None     EBL: * No values recorded between 5/1/2024 12:00 AM and 5/1/2024 11:57 AM *     Implants: * No implants in log *    Drains: None     Problem List:   Problem List Items Addressed This Visit    None  Visit Diagnoses       Primary osteoarthritis of right hip    -  Primary    Relevant Orders    Place in Outpatient (Completed)    Vital signs    Notify Physician - Potential Need of Opioid Reversal    Notify Anesthesiologist if patient on home insulin pump    Vital signs - notify MD    Diet NPO    Insert peripheral IV    FOOT COMPRESSION PUMP    Place foot compression device    Chlorhexidine (CHG) 2% Wipes    Ortho Pathway Patient    Place in Outpatient - Extended Recovery    Osteoarthritis of right hip        Relevant Orders    Place in Outpatient (Completed)    Vital signs    Notify Physician - Potential Need of Opioid Reversal    Notify Anesthesiologist if patient on home insulin pump    Vital signs - notify MD    Diet NPO    Insert peripheral IV    FOOT COMPRESSION PUMP    Place foot compression device    Chlorhexidine (CHG) 2% Wipes    Ortho Pathway Patient    Place in Outpatient - Extended Recovery    Preoperative testing        Relevant Orders    POCT glucose                OPERATIVE INDICATIONS: The patient has a history of progressive right hip pain and arthritis. Their hip pain is severe with activity and has progressed significantly. X-rays reveal eburnation of articular cartilage on the superior weight-bearing surface of the hip with joint space narrowing and acetabular and femoral neck osteophytes consistent with advanced hip osteoarthritis. Non-operative treatment has been attempted, but has not improved or controlled symptoms during normal daily activities. Motion has become limited and rotation severely restricted. A total hip arthroplasty was recommended at this time. The risks, benefits and potential complications of the arthroplasty surgery were discussed with the patient in detail. Specific details of the procedure, hospitalization, recovery, rehabilitation, and long-term precautions were also provided. Pre-operative teaching was provided. Implant/prosthesis selection was outlined, and the many options available were explained; the final choice will be made at the time of the procedure to match the anatomy and condition of the bone, ligaments, tendons, and muscles. Understanding of all topics was conveyed to me by the patient, and consent was given to proceed with a total hip arthroplasty.      The patient was seen by Internal Medicine/Anesthesia for pre-operative optimization. Keyla-operative blood management and the potential for blood transfusion were discussed with risks and options clearly outlined. We discussed the risks of the procedure in detail, which included but is not limited to infection, bleeding, scarring, injury to blood vessels, nerves, muscular structures. We discussed specifically instability/dislocation, fracture, and leg-length complications.      OPERATIVE PROCEDURE: The patient was identified and brought into the Operating Room by the anesthesia and nursing team. Spinal anesthesia was successfully performed.  Intravenous antibiotic of Cefazolin  prophylaxis dosing was confirmed. The patient was then positioned in the lateral decubitus on the hip pegboard. An axillary roll was placed, and all other pressure points were checked and padded. The hip was then examined and restrictions noted. A relative leg length assessment was carried out and markers were placed for intra-operative assessment. The leg was then prepped and draped in the usual sterile fashion.     A surgical time-out was performed immediately preceding the incision with all personnel in the operating room; the patient identity was again confirmed, the surgical site and extremity were identified and confirmed, X-rays were reviewed, and availability of the appropriate surgical equipment was established.      The right hip was then exposed through a limited skin incision centered over the greater trochanter. Dissection was carried down through skin and subcutaneous tissue to the gluteal fascia. The gluteus taiwo was split posteriorly over the trochanter in the direction of its fibers preserving the ITB.  Bleeders were controlled with electrocautery. A direct superior approach to the hip was accomplished, reflecting the piriformis.  A T-capsulotomy was performed for later repair.  The remainder of the capsule was not disrupted. The labrum was split and the femoral head mobilized. An in situ neck cut was made and the femoral head was removed. Assessment of the femoral head revealed severe eburnation of articular cartilage with complete loss of weight bearing chondral surface and formation of cysts in the head and neck. Marginal osteophytes were present surrounding the femoral neck.     JONEL pins were placed superiorly. Array placed. Registration completed. Single reaming with planned cup was completed. The reamers created an excellent hemispherical bed of bleeding cancellous bone.  The cup was inserted with an excellent press fit. The press-fit was firm, stable, and apically seated. One  screws were  used for additional support of the fixation. The Polyethylene bearing/liner was then impacted into place and checked for stability. JONEL pins were removed.      Attention was then turned to the femur. The leg was positioned so access did not result in soft-tissue injury. The medullary cavity of the femur was entered and opened with hand reamers. broaches were then employed in an incremental fashion up to the final size. The final broach was then fully seated, had good rotational and axial stability, and was seated at the appropriate height in relation to the greater trochanter and the template. Trial reduction was done. Excellent stability and range of motion was achieved without impingement at any position. Leg lengths were re-created within millimeters based on the markers and relative measurement. The trials were then dislocated and removed. The wound was copiously irrigated, and the permanent femoral stem was then impacted down in approximately 20 degrees of anteversion. The press-fit was firm, and stable to axial and rotational force in all planes. The permanent femoral head was then impacted on the clean trunion. The socket and wound were irrigated, suctioned, and inspected for debris. The final reduction was performed, and again the hip was stable in all planes without impingement when stressed to the extremes.      The capsule was repaired. The wound was irrigated. Instrument and sponge count was completed and confirmed correct. The gluteal fascia was closed with interrupted Stratafix suture. Deep subcutaneous tissue was closed with a running #1 Vicryl, and more superficial with interrupted 2-0 Vicryl. A 3-0 Monocryl stitch was used for the skin. Dermabond adhesive was applied. A sterile silver-impregnated dressing was placed. PAS stockings were placed. The patient was then returned to the supine position on the operating room table. After stability was confirmed they were transferred to a hospital bed and  taken to Recovery/PACU.       POST-OPERATIVE PLAN:  Patient will be transferred to the floor once in stable condition and after radiographs confirm no immediate complications. The patient will be treated with multimodal pain management protocols. They will be placed on anticoagulation of Eloquis 2.5mg BID for 5 days then resume home dose, to start on POD1. The patient will be weight bearing as tolerated with posterior hip precautions for 6 weeks. They will work with physical therapy, have a graduated diet, and once medially stable and safe for home can be discharged. Patient will follow up with me 3 weeks post operatively. Dressing should be removed by patient 7 days post operatively.

## 2024-05-01 NOTE — TRANSFER OF CARE
Anesthesia Transfer of Care Note    Patient: Michael Lennon    Procedure(s) Performed: Procedure(s) (LRB):  ARTHROPLASTY, HIP, TOTAL, USING COMPUTER-ASSISTED NAVIGATION (Right)    Patient location: PACU    Anesthesia Type: spinal and MAC    Transport from OR: Transported from OR on room air with adequate spontaneous ventilation    Post pain: adequate analgesia    Post assessment: no apparent anesthetic complications and tolerated procedure well    Post vital signs: stable    Level of consciousness: awake    Nausea/Vomiting: no nausea/vomiting    Complications: none    Transfer of care protocol was followed      Last vitals: Visit Vitals  BP (!) 111/53 (BP Location: Left arm)   Pulse 68   Temp 36.4 °C (97.5 °F) (Oral)   Resp 17   SpO2 100%

## 2024-05-02 VITALS
WEIGHT: 213.63 LBS | RESPIRATION RATE: 18 BRPM | HEART RATE: 86 BPM | DIASTOLIC BLOOD PRESSURE: 82 MMHG | TEMPERATURE: 99 F | OXYGEN SATURATION: 98 % | BODY MASS INDEX: 33.53 KG/M2 | HEIGHT: 67 IN | SYSTOLIC BLOOD PRESSURE: 176 MMHG

## 2024-05-02 LAB
POCT GLUCOSE: 116 MG/DL (ref 70–110)
POCT GLUCOSE: 123 MG/DL (ref 70–110)

## 2024-05-02 PROCEDURE — 97165 OT EVAL LOW COMPLEX 30 MIN: CPT

## 2024-05-02 PROCEDURE — 63600175 PHARM REV CODE 636 W HCPCS: Performed by: ORTHOPAEDIC SURGERY

## 2024-05-02 PROCEDURE — 25000003 PHARM REV CODE 250: Performed by: ORTHOPAEDIC SURGERY

## 2024-05-02 PROCEDURE — 97161 PT EVAL LOW COMPLEX 20 MIN: CPT

## 2024-05-02 PROCEDURE — 97535 SELF CARE MNGMENT TRAINING: CPT

## 2024-05-02 PROCEDURE — 97110 THERAPEUTIC EXERCISES: CPT

## 2024-05-02 RX ORDER — IBUPROFEN 200 MG
24 TABLET ORAL
Status: DISCONTINUED | OUTPATIENT
Start: 2024-05-02 | End: 2024-05-02 | Stop reason: HOSPADM

## 2024-05-02 RX ORDER — LISINOPRIL 20 MG/1
40 TABLET ORAL DAILY
Status: CANCELLED | OUTPATIENT
Start: 2024-05-02

## 2024-05-02 RX ORDER — FUROSEMIDE 20 MG/1
20 TABLET ORAL DAILY
Status: CANCELLED | OUTPATIENT
Start: 2024-05-02

## 2024-05-02 RX ORDER — AMLODIPINE BESYLATE 5 MG/1
10 TABLET ORAL DAILY
Status: DISCONTINUED | OUTPATIENT
Start: 2024-05-02 | End: 2024-05-02 | Stop reason: HOSPADM

## 2024-05-02 RX ORDER — POLYETHYLENE GLYCOL 3350 17 G/17G
17 POWDER, FOR SOLUTION ORAL DAILY
Status: DISPENSED | OUTPATIENT
Start: 2024-05-02

## 2024-05-02 RX ORDER — INSULIN ASPART 100 [IU]/ML
0-5 INJECTION, SOLUTION INTRAVENOUS; SUBCUTANEOUS
Status: DISCONTINUED | OUTPATIENT
Start: 2024-05-02 | End: 2024-05-02 | Stop reason: HOSPADM

## 2024-05-02 RX ORDER — MUPIROCIN 20 MG/G
1 OINTMENT TOPICAL 2 TIMES DAILY
Status: ACTIVE | OUTPATIENT
Start: 2024-05-02 | End: 2024-05-07

## 2024-05-02 RX ORDER — CLONIDINE HYDROCHLORIDE 0.1 MG/1
0.3 TABLET ORAL 2 TIMES DAILY
Status: DISCONTINUED | OUTPATIENT
Start: 2024-05-02 | End: 2024-05-02 | Stop reason: HOSPADM

## 2024-05-02 RX ORDER — CELECOXIB 100 MG/1
200 CAPSULE ORAL DAILY
Status: DISPENSED | OUTPATIENT
Start: 2024-05-02

## 2024-05-02 RX ORDER — METHOCARBAMOL 750 MG/1
750 TABLET, FILM COATED ORAL 3 TIMES DAILY
Status: DISPENSED | OUTPATIENT
Start: 2024-05-02

## 2024-05-02 RX ORDER — IBUPROFEN 200 MG
16 TABLET ORAL
Status: DISCONTINUED | OUTPATIENT
Start: 2024-05-02 | End: 2024-05-02 | Stop reason: HOSPADM

## 2024-05-02 RX ORDER — GLUCAGON 1 MG
1 KIT INJECTION
Status: DISCONTINUED | OUTPATIENT
Start: 2024-05-02 | End: 2024-05-02 | Stop reason: HOSPADM

## 2024-05-02 RX ADMIN — SODIUM CHLORIDE: 9 INJECTION, SOLUTION INTRAVENOUS at 04:05

## 2024-05-02 RX ADMIN — ASPIRIN 81 MG: 81 TABLET, COATED ORAL at 08:05

## 2024-05-02 RX ADMIN — SENNOSIDES AND DOCUSATE SODIUM 1 TABLET: 50; 8.6 TABLET ORAL at 08:05

## 2024-05-02 RX ADMIN — FAMOTIDINE 20 MG: 20 TABLET ORAL at 08:05

## 2024-05-02 RX ADMIN — CEFAZOLIN 2 G: 2 INJECTION, POWDER, FOR SOLUTION INTRAMUSCULAR; INTRAVENOUS at 04:05

## 2024-05-02 RX ADMIN — AMLODIPINE BESYLATE 10 MG: 5 TABLET ORAL at 08:05

## 2024-05-02 RX ADMIN — METHOCARBAMOL TABLETS 750 MG: 750 TABLET, COATED ORAL at 02:05

## 2024-05-02 RX ADMIN — POLYETHYLENE GLYCOL 3350 17 G: 17 POWDER, FOR SOLUTION ORAL at 02:05

## 2024-05-02 RX ADMIN — CLONIDINE HYDROCHLORIDE 0.3 MG: 0.1 TABLET ORAL at 08:05

## 2024-05-02 RX ADMIN — CELECOXIB 200 MG: 100 CAPSULE ORAL at 02:05

## 2024-05-02 NOTE — NURSING
Patient cleared for discharge by , PT/OT, and Irina WILEY.  Right hip surgical dressing is clean, dry, and intact. Nurse notified  of  and 176. Patient is asymptomatic, per  patient ok to go and restart home BP medications.  Patient awake and alert, RR even and unlabored room air.  PIV removed as ordered, catheter tip intact, pressure held, dressing placed.  Patient left unit via wheelchair with transport and family.

## 2024-05-02 NOTE — PLAN OF CARE
Message sent to Dr Clark to request [facility] orders for pt to d/c to home on today.  [place] to follow for response.   O-Z Flap Text: The defect edges were debeveled with a #15 scalpel blade.  Given the location of the defect, shape of the defect and the proximity to free margins an O-Z flap was deemed most appropriate.  Using a sterile surgical marker, an appropriate transposition flap was drawn incorporating the defect and placing the expected incisions within the relaxed skin tension lines where possible. The area thus outlined was incised deep to adipose tissue with a #15 scalpel blade.  The skin margins were undermined to an appropriate distance in all directions utilizing iris scissors.

## 2024-05-02 NOTE — PT/OT/SLP EVAL
Left message on machine in [place] notifying pt that it is ok to wait till 4/19 for the flex  Sig per Dr. Nath.   Occupational Therapy Evaluation and Treatment/Discharge    Name: Michael Lennon  MRN: [id number]  Admitting Diagnosis: <principal problem not specified> 1 Day Post-Op  Recent Surgery: Procedure(s) (LRB):  ARTHROPLASTY, HIP, TOTAL, USING COMPUTER-ASSISTED NAVIGATION (Right) 1 Day Post-Op    Recommendations:     Discharge Recommendations: Low Intensity Therapy  Level of Assistance Recommended: Intermittent assistance  Discharge Equipment Recommendations: bath bench (reacher)  Barriers to discharge: None    Assessment:     Michael Lennon is a 83 y.o. male with a medical diagnosis of <principal problem not specified>. He presents with performance deficits affecting function including orthopedic precautions, impaired skin, decreased ROM, decreased lower extremity function, impaired functional mobility, gait instability, impaired balance, impaired self care skills. The patient participated in OT eval with c/o 2/10 right hip pain. The patient was able to amb using a RW to the sink and stand to perform grooming tasks with SBA/(S). The patient was educated re: LB dressing using a hip kit and was able to perform a return demonstration. The patient is aware of posterior hip precautions and purchased a reacher for use at home. The patient was provided with handouts re: home safety, car transfers and bed mobility while observing posterio hip precautions. The patient will have assist from his daughter as needed.   The patient is OK for D/C to home from OT standpoint.    Rehab Prognosis: Good; patient would benefit from acute OT services to address these deficits and reach maximum level of function.    Plan:     Patient to be seen   to address the above listed problems via    Plan of Care Expires:    Plan of Care Reviewed with: patient, daughter    Subjective     Chief Complaint: ready for D/C  Patient Comments/Goals: go home today  Pain/Comfort:  Pain Rating 1: 2/10  Location - Side 1: Right  Location 1: hip  Pain Addressed 1:  Pre-medicate for activity, Reposition, Distraction, Cessation of Activity    Patients cultural, spiritual, Restorationism conflicts given the current situation: no    Social History:  Living Environment: Patient lives alone in a single story home with number of inside stair(s): 1 and tub-shower combo (dtr lives 14 houses down)  Prior Level of Function: Prior to admission, patient was modified independent with ADLs using straight cane for mobility. Has a sock aide and long shoe horn  Roles and Routines: Patient was driving prior to admission.  Equipment Used at Home: cane, straight, walker, rolling  DME owned (not currently used): none  Assistance Upon Discharge:  daughter    Objective:     Communicated with MIROSLAVA Mina prior to session. Patient found up in chair with peripheral IV upon OT entry to room.    General Precautions: Standard, fall, diabetic   Orthopedic Precautions: RLE weight bearing as tolerated, RLE posterior precautions   Braces: N/A    Respiratory Status: Room air    Occupational Performance    Gait belt applied - Yes    Bed Mobility:   N/T    Functional Mobility/Transfers:  Sit <> Stand Transfer with stand by assistance with rolling walker  Functional Mobility: The patient stood from the chair using a RW with SBA x3 trials. The patient amb using a RW to the sink to perform grooming tasks with SBA and VC for RW use.    Activities of Daily Living:  Grooming: supervision and stand by assistance to stand at the sink to brush his teeth and wash his hands and face  Lower Body Dressing: The patient was able to don B socks using a sock aide with VC after demo. The patient was able to use his personal sock aide (sits on the floor) to don the left sock and the sock aide with ropes to don his right sock after demo. The patient was able to doff B socks using a reacher while seated in the chair.   Toileting: modified independence to use a urinal     Cognitive/Visual Perceptual:  Cognitive/Psychosocial Skills:    -      Oriented to: Person, Place, Time, Situation  -     Follows Commands/attention: Follows multistep  commands  -     Communication: clear/fluent  -     Memory: No Deficits noted  -     Safety awareness/insight to disability: intact  -     Mood/Affect/Coping skills/emotional control: Appropriate to situation  Visual/Perceptual:    -     Intact    Physical Exam:  Balance:    -     Sitting: independence  -     Standing: stand by assistance  Postural examination/scapula alignment:    -       No postural abnormalities identified  Skin integrity: Visible skin intact and right hip incision not visualized  Edema:  None noted  Sensation:    -       Intact  Upper Extremity Range of Motion:     -       Right Upper Extremity: WFL  -       Left Upper Extremity: WFL  Upper Extremity Strength:    -       Right Upper Extremity: WFL  -       Left Upper Extremity: WFL    AMPAC 6 Click ADL:  AMPAC Total Score: 23    Treatment & Education:  Educated on the importance of mobility to maximize recovery  Educated on Posterior hip precautions - patient recalled 4/4 hip precautions  Educated on use of hip kit during LB dressing  Educated on safety with functional mobility; hand placement to ensure safe transfers to various surfaces in prep for ADLs  Educated on performing functional mobility and ADLs in adherence to orthopedic precautions  OT discussed DME needs and assistance available at home. The patient purchased a reacher from [employer] that will be delivered tomorrow. Info was provided about purchasing a TTB. The patient states he will consider purchase when he goes home.     Patient clear to ambulate to/from bathroom with RN/PCT, assist x1/(S) .    Patient left up in chair with all lines intact, call button in reach, RN notified, and daughter present.    GOALS:   Multidisciplinary Problems       Occupational Therapy Goals       Not on file              Multidisciplinary Problems (Resolved)          Problem: Occupational Therapy    Goal  Priority Disciplines Outcome Interventions   Occupational Therapy Goal   (Resolved)     OT, PT/OT Met                        History:     Past Medical History:   Diagnosis Date    Anticoagulant long-term use     Cancer     skin on face    Diabetes mellitus     Hypercholesteremia     Hypertension     Paroxysmal atrial fibrillation          Past Surgical History:   Procedure Laterality Date    ABLATION      APPENDECTOMY      DISC REMOVAL  01/01/1970    KNEE SURGERY  01/01/2003    left    KNEE SURGERY  01/01/2010    right       Time Tracking:     OT Date of Treatment: 05/02/24  OT Start Time: 1002  OT Stop Time: 1043  OT Total Time (min): 41 min    Billable Minutes: Evaluation 15 and Self Care/Home Management 26    5/2/2024

## 2024-05-02 NOTE — PLAN OF CARE
Problem: Adult Inpatient Plan of Care  Goal: Plan of Care Review  Outcome: Adequate for Care Transition  Goal: Patient-Specific Goal (Individualized)  Outcome: Adequate for Care Transition  Goal: Absence of Hospital-Acquired Illness or Injury  Outcome: Adequate for Care Transition  Goal: Optimal Comfort and Wellbeing  Outcome: Adequate for Care Transition  Goal: Readiness for Transition of Care  Outcome: Adequate for Care Transition     Problem: Pain Acute  Goal: Optimal Pain Control and Function  Outcome: Adequate for Care Transition     Problem: Hip Arthroplasty  Goal: Optimal Coping  Outcome: Adequate for Care Transition  Goal: Absence of Bleeding  Outcome: Adequate for Care Transition  Goal: Effective Bowel Elimination  Outcome: Adequate for Care Transition  Goal: Fluid and Electrolyte Balance  Outcome: Adequate for Care Transition  Goal: Optimal Functional Ability  Outcome: Adequate for Care Transition  Goal: Absence of Infection Signs and Symptoms  Outcome: Adequate for Care Transition  Goal: Intact Neurovascular Status  Outcome: Adequate for Care Transition  Goal: Anesthesia/Sedation Recovery  Outcome: Adequate for Care Transition  Goal: Acceptable Pain Control  Outcome: Adequate for Care Transition  Goal: Nausea and Vomiting Relief  Outcome: Adequate for Care Transition  Goal: Effective Urinary Elimination  Outcome: Adequate for Care Transition  Goal: Effective Oxygenation and Ventilation  Outcome: Adequate for Care Transition     Problem: Fall Injury Risk  Goal: Absence of Fall and Fall-Related Injury  Outcome: Adequate for Care Transition     Problem: Wound  Goal: Optimal Coping  Outcome: Adequate for Care Transition  Goal: Optimal Functional Ability  Outcome: Adequate for Care Transition  Goal: Absence of Infection Signs and Symptoms  Outcome: Adequate for Care Transition  Goal: Improved Oral Intake  Outcome: Adequate for Care Transition  Goal: Optimal Pain Control and Function  Outcome: Adequate for  Care Transition  Goal: Skin Health and Integrity  Outcome: Adequate for Care Transition  Goal: Optimal Wound Healing  Outcome: Adequate for Care Transition

## 2024-05-02 NOTE — ANESTHESIA POSTPROCEDURE EVALUATION
Anesthesia Post Evaluation    Patient: Michael Lennon    Procedure(s) Performed: Procedure(s) (LRB):  ARTHROPLASTY, HIP, TOTAL, USING COMPUTER-ASSISTED NAVIGATION (Right)    Final Anesthesia Type: spinal      Patient location during evaluation: PACU  Patient participation: Yes- Able to Participate  Level of consciousness: awake and alert  Post-procedure vital signs: reviewed and stable  Pain management: adequate  Airway patency: patent    PONV status at discharge: No PONV  Anesthetic complications: no      Respiratory status: spontaneous ventilation and room air  Hydration status: euvolemic  Follow-up not needed.              Vitals Value Taken Time   /73 05/02/24 0731   Temp 36.8 °C (98.2 °F) 05/02/24 0731   Pulse 70 05/02/24 0731   Resp 18 05/02/24 0731   SpO2 96 % 05/02/24 0731         Event Time   Out of Recovery 18:15:00         Pain/June Score: Pain Rating Prior to Med Admin: 2 (5/1/2024 11:06 PM)  Pain Rating Post Med Admin: 0 (5/2/2024 12:06 AM)  June Score: 10 (5/1/2024  6:00 PM)

## 2024-05-02 NOTE — PLAN OF CARE
Problem: Occupational Therapy  Goal: Occupational Therapy Goal  Outcome: Met     The patient participated in OT eval with c/o 2/10 right hip pain. The patient was able to amb using a RW to the sink and stand to perform grooming tasks with SBA/(S). The patient was educated re: LB dressing using a hip kit and was able to perform a return demonstration. The patient is aware of posterior hip precautions and purchased a reacher for use at home. The patient will have assist from his daughter as needed.   The patient is OK for D/C to home from OT standpoint.

## 2024-05-02 NOTE — NURSING
Pt arrived to unit via room.Pt AAOX4, respirations even and unlabored, no acute distress, complaints of pain. Family at the bedside. Safety precautions in place, call light in reach, urinal and Incentive Spirometer at the bedside. Scds and michel on will continue to monitor.

## 2024-05-02 NOTE — PLAN OF CARE
Orthopedic Discharge Orders: Home [facility]         Expected Discharge Date: POD1    Diagnoses:  Post-op hip replacement    Patient is homebound due to:   Pt requires home health services due to taxing effort to leave the home as a result of immobility from Post-op hip replacement    Weight Bearing Status:   full weight bearing    Posterior Hip Precautions for 6 weeks, AVOID:  -Avoid greater than 90 degrees of flexion, internal rotation, and adduction  -Avoid extension, external rotation, and abduction  -Must sleep with hip abduction pillow or regular pillow b/t legs    Physical Therapy   3 times a week for 2 weeks (Call for further orders)  - Ambulate with a rolling walker  - Progress to cane  -Instruct on ROM and strengthening of knee  -Set up for outpt once staples removed and MOD 1 with cane    Wound Care:   If patient is discharged with aqua keith/silver dressing, leave on for 7-10 days unless saturated border to border, then follow instructions below:  Cleanse with wound cleanser or normal saline and apply island dressing.  If island dressing not available apply gauze and tegaderm.  Change surgical dressing 3 times a week and PRN  in standing position. Pt may shower if surgical dressing is waterproof. Removal and replacement of dressing after shower only needed if incision is suspected to have gotten wet during shower.  Otherwise change as previously described depending on dressing/drainage. No soaking in the tub or hot tub for 6 weeks.    Wear  TEDS Bilateral Knee High Stockings for 3  Weeks. OK to remove stockings 1-2 hours/day max if desired.    Cold therapy/Ice: encouraged at least 20 minutes 2-3 times daily or more if desired.  Incision must be kept waterproof while icing.      Contact:  Please contact [phone] with questions or concerns    BLOOD THINNER:    If sent home on Lovenox : 28 days post-op for TKR /OLAYINKA  If sent home home on ASA:    81mg   BID x 4 weeks  If on Plavix, will resume home dosing  regimen and stopping hospital anticoagulants after 5 days post op   Once finished with prescribed blood thinner, patients can return to pre-surgical ASA dosage if they took ASA before surgery.       Outpatient Therapy Preference: [facility] 2 weeks after operation  [address]  [place] [id number]    DME:  - Per PT/OT Recommendation

## 2024-05-02 NOTE — PLAN OF CARE
Problem: Adult Inpatient Plan of Care  Goal: Plan of Care Review  Outcome: Progressing  Flowsheets (Taken 5/2/2024 0626)  Plan of Care Reviewed With: patient  Goal: Absence of Hospital-Acquired Illness or Injury  Outcome: Progressing  Goal: Optimal Comfort and Wellbeing  Outcome: Progressing  Intervention: Monitor Pain and Promote Comfort  Flowsheets (Taken 5/2/2024 0626)  Pain Management Interventions:   pillow support provided   quiet environment facilitated     Problem: Pain Acute  Goal: Optimal Pain Control and Function  Outcome: Progressing

## 2024-05-02 NOTE — PT/OT/SLP EVAL
Physical Therapy Evaluation and Discharge Home Today    Patient Name:  Michael Lennon   MRN:  [id number]    Recommendations:     Discharge Recommendations: Low Intensity Therapy with caregiver support  Discharge Equipment Recommendations: bath bench   Barriers to discharge: None    Assessment:     Michael Lennon is a 83 y.o. male admitted with a medical diagnosis of R OLAYINKA.     Recent Surgery: Procedure(s) (LRB):  ARTHROPLASTY, HIP, TOTAL, USING COMPUTER-ASSISTED NAVIGATION (Right) 1 Day Post-Op    Plan:     Pt to be D/C'ed home today.     Subjective     Chief Complaint: expected surgical pain  Patient/Family Comments/goals: Pt would like to be able to independently walk to dtr's house (14 houses down from pt's house).  Pain/Comfort:  Pain Rating 1: 5/10  Location - Side 1: Right  Location 1: hip  Pain Addressed 1: Pre-medicate for activity, Reposition, Distraction, Cessation of Activity      Living Environment:  Pt lives alone in a H with 1STE at entry.  Dtr's lives 14 houses down.   Prior to admission, patients level of function was mod I with ambulation using SPC PRN.  Pt was driving PTA.  Equipment at home: walker, rolling (family RW), cane, straight.  Upon discharge, patient will have assistance from dtr.    Objective:     Patient found HOB elevated with peripheral IV, SCD upon PT entry to room.    General Precautions: Standard, fall, diabetic    Orthopedic Precautions:RLE weight bearing as tolerated, RLE posterior precautions   Braces: N/A  Respiratory Status: Room air    Exams:  Cognitive Exam:  Patient was able to follow multiple commands.   Gross Motor Coordination:  WFL  Postural Exam:  Patient presented with the following abnormalities:    -       Rounded shoulders  -       Forward head  Sensation:    -       Intact  light/touch BLE  Skin Integrity/Edema:      -       Skin integrity: Visible skin intact and R hip dressing intact/dry  -       Edema: Mild RLE  BLE ROM: WFL  BLE Strength: WFL    Functional  Mobility:  Bed Mobility:     Scooting: stand by assistance  Supine to Sit: contact guard assistance with RLE  Transfers:     Sit to Stand: contact guard assistance with rolling walker  Bed to Chair: stand by assistance and contact guard assistance with  rolling walker  using  Step Transfer  Gait: Pt ambulated ~200 ft with CGA-SBA using RW.  Pt with decreased weight shifting, decreased step length, and decreased lore.   Balance: Pt with fair+ dynamic standing balance.     AM-PAC 6 CLICK MOBILITY  Total Score:19       Treatment and Education:  BLE seated therex 2 sets x10 reps: AP and LAQ  BLE supine therex x15 reps: QS and GS    Pt educated in posterior hip precautions: no bending/flexing surgical hip >90 degrees, no crossing of surgical leg beyond midline, and no internal rotation of surgical leg beyond neutral (don't twist body toward surgical leg). Handout provided at [facility] along with HEP for LE therex s/p OLAYINKA.  Pt encouraged to perform LE therex daily.     Pt/dtr educated on HHPT services and car transfers.    Pt to call for nursing assistance while in the hospital.  Pt verbalized good understanding.       Patient left up in chair reclined with all lines intact, call button in reach, nurse Desiree notified, and dtr present.  Tray table close by.     GOALS:   Multidisciplinary Problems       Physical Therapy Goals       Not on file              Multidisciplinary Problems (Resolved)          Problem: Physical Therapy    Goal Priority Disciplines Outcome Goal Variances Interventions   Physical Therapy Goal   (Resolved)     PT, PT/OT Met                         History:     Past Medical History:   Diagnosis Date    Anticoagulant long-term use     Cancer     skin on face    Diabetes mellitus     Hypercholesteremia     Hypertension     Paroxysmal atrial fibrillation        Past Surgical History:   Procedure Laterality Date    ABLATION      APPENDECTOMY      DISC REMOVAL  01/01/1970    KNEE SURGERY  01/01/2003     left    KNEE SURGERY  01/01/2010    right       Time Tracking:     PT Received On: 05/02/24  PT Start Time: 0914     PT Stop Time: 0937  PT Total Time (min): 23 min     Billable Minutes: Evaluation 13 min and Therapeutic Exercise 10 min      05/02/2024

## 2024-05-02 NOTE — DISCHARGE SUMMARY
[facility]  Orthopedics  Discharge Summary      Patient Name: Michael Lennon  MRN: [id number]  Admission Date: 5/1/2024  Hospital Length of Stay: 0 days  Discharge Date and Time:  05/02/2024 10:31 AM  Attending Physician: Adarsh Clark MD   Discharging Provider: Adarsh Clark MD  Primary Care Provider: Kalin Cobos MD    HPI: Right Hip Osteoarthritis    Procedure(s) (LRB):  ARTHROPLASTY, HIP, TOTAL, USING COMPUTER-ASSISTED NAVIGATION (Right)      Hospital Course:     Hospital Course    The patient is a 82 yo male  who has been followed in clinic for right hip arthritis. It was determined she would benefit from surgery. The procedure, its risks, benefits, and potential complications were discussed in detail with the patient prior to surgery. Understanding of all topics was conveyed by the patient, and consent was given for surgery. The patient was admitted to [facility] on 5/1/2024 for right hip arthroplasty.    The procedure was tolerated well and he was sent to the post-operative recovery room in stable condition, where he also did well. Post-operative x-rays in the recovery room showed well-aligned components without evidence of complication or fracture. He was subsequently sent to his hospital room for postoperative management.  ?  Once on the floor hispostoperative course  was unremarkable and he did well. his diet was advanced which was tolerated. his pain was well controlled on multimodal pain medication; this was eventually transitioned to oral medication alone prior to discharge. He worked with Physical Therapy starting on POD#0 who recommended he be discharged home. Their dressing remained clean dry and intact throughout the hospital stay. He remained afebrile with stable vital signs throughout the stay. He/she was stable for discharge on POD#1.          Significant Diagnostic Studies: No pertinent studies.    Pending Diagnostic Studies:       None          There are no hospital  "problems to display for this patient.     Discharged Condition: good    Disposition: Home-Health Care [facility]    Follow Up:    Patient Instructions:     Justification of Rolling Walker:  The mobility limitation can not be sufficiently resolved by the use of a cane.  Patient's functional mobility deficit can be sufficiently resolved with the use of a rolling walker.  Patient has mobility limitation significantly impairs their ability to participate in 1 or more activities of daily living.  The use of the rolling walker we will significantly improve the patient's ability to participate in MRADLS and the patient will use it on a regular basis in the home.       WALKER FOR HOME USE     Order Specific Question Answer Comments   Type of Walker: Adult (5'4"-6'6")    With wheels? No    Height: 5'7    Weight: 9806kg    Length of need (1-99 months): 99    Please check all that apply: Walker will be used for gait training.      3 IN 1 COMMODE FOR HOME USE     Order Specific Question Answer Comments   Type: Standard    Height: 5'7    Weight: 9806kg    Length of need (1-99 months): 99      TRANSFER TUB BENCH FOR HOME USE     Order Specific Question Answer Comments   Type of Transfer Tub Bench: Unpadded    Height: 5'7    Weight: 9806kg    Length of need (1-99 months): 99      Activity as tolerated     Lifting restrictions   Order Comments: No strenuous exercise or lifting of > 10 lbs     Weight bearing as tolerated     No driving, operating heavy equipment or signing legal documents while taking pain medication     Call MD for:  temperature >100.4     Call MD for:  persistent nausea and vomiting     Call MD for:  severe uncontrolled pain     Call MD for:  difficulty breathing, headache or visual disturbances     Call MD for:  redness, tenderness, or signs of infection (pain, swelling, redness, odor or green/yellow discharge around incision site)     Call MD for:  hives     Call MD for:  persistent dizziness or light-headedness "     Call MD for:  extreme fatigue     Medications:  Reconciled Home Medications:      Medication List        START taking these medications      acetaminophen 500 MG tablet  Commonly known as: TYLENOL  Take 2 tablets (1,000 mg total) by mouth every 8 (eight) hours as needed for Pain.     docusate sodium 100 MG capsule  Commonly known as: COLACE  Take 1 capsule (100 mg total) by mouth 2 (two) times daily.     methocarbamoL 500 MG Tab  Commonly known as: ROBAXIN  Take 1 tablet (500 mg total) by mouth 4 (four) times daily. for 10 days     oxyCODONE 5 MG immediate release tablet  Commonly known as: ROXICODONE  Take 1-2 tablets (5-10 mg total) by mouth every 4 (four) hours as needed (pain).            CHANGE how you take these medications      apixaban 2.5 mg Tab  Commonly known as: ELIQUIS  Take 1 tablet (2.5 mg total) by mouth 2 (two) times daily for 5 days, then resume previously prescribed Eliquis dose  What changed:   medication strength  how much to take            CONTINUE taking these medications      amLODIPine 10 MG tablet  Commonly known as: NORVASC  Take 10 mg by mouth once daily.     cloNIDine 0.3 MG tablet  Commonly known as: CATAPRES  Take 0.3 mg by mouth 2 (two) times daily.     hydroCHLOROthiazide 12.5 mg capsule  Commonly known as: MICROZIDE  Take 12.5 mg by mouth once daily.     ketoconazole 2 % shampoo  Commonly known as: NIZORAL     LASIX 20 mg tablet  Generic drug: furosemide  Take 20 mg by mouth once daily.     lisinopriL 40 MG tablet  Commonly known as: PRINIVIL,ZESTRIL  Take 40 mg by mouth once daily.     metFORMIN 500 MG tablet  Commonly known as: GLUCOPHAGE  Take 500 mg by mouth every evening.     MITIGARE 0.6 mg Cap  Generic drug: colchicine  Take 0.6 mg by mouth every other day.     potassium chloride 10 MEQ Cpsr  Commonly known as: MICRO-K  Take 10 mEq by mouth once daily.              Adarsh Clark MD  Orthopedics  [facility]

## 2024-05-02 NOTE — PLAN OF CARE
Problem: Physical Therapy  Goal: Physical Therapy Goal  Outcome: Met     Pt ambulated ~200 ft with SBA using RW.  Pt will benefit from low intensity therapy and TTB for home.

## 2024-05-02 NOTE — PLAN OF CARE
05/02/24 1322   Final Note   Assessment Type Final Discharge Note   Anticipated Discharge Disposition Home-Health   Hospital Resources/Appts/Education Provided Post-Acute resouces added to AVS;Appointments scheduled and added to AVS   Post-Acute Status   Post-Acute Authorization Home Health   Home Health Status Set-up Complete/Auth obtained     Pts nurse Desiree notified that the pt can d/c from CM standpoint

## 2024-05-03 ENCOUNTER — TELEPHONE (OUTPATIENT)
Dept: ORTHOPEDICS | Facility: CLINIC | Age: 84
End: 2024-05-03
Payer: MEDICARE

## 2024-05-03 DIAGNOSIS — Z96.641 STATUS POST RIGHT HIP REPLACEMENT: Primary | ICD-10-CM

## 2024-05-03 PROCEDURE — G0180 MD CERTIFICATION HHA PATIENT: HCPCS | Mod: ,,, | Performed by: ORTHOPAEDIC SURGERY

## 2024-05-03 NOTE — TELEPHONE ENCOUNTER
Called the patient today regarding his surgery with Dr. Adarsh Clark. The patient had a right OLAYINKA on 5/1/24.     Pain Scale: 2 / 10  Any issues with Fever: No.  Completing at home exercises: Yes:   Any concerns regarding their dressing/bandage: No.  Patient confirmed first HH-PT appointment:  Today at 9am.   Any other concerns: No.  The patient was informed that if they have post op EMERGENCY's  to call the orthopedic Post-op Hot Line at [phone] .The patient was reminded that if they have any chest pain or shortness of breath to call 911 or go to the ER.  The patient verbalized understanding and does not have any other questions     Malathi Keller CST  Clinical- OR Assistant to Dr. Adarsh Clark  [facility]  [phone]

## 2024-05-08 DIAGNOSIS — Z96.641 STATUS POST RIGHT HIP REPLACEMENT: Primary | ICD-10-CM

## 2024-05-20 ENCOUNTER — CLINICAL SUPPORT (OUTPATIENT)
Dept: REHABILITATION | Facility: HOSPITAL | Age: 84
End: 2024-05-20
Attending: ORTHOPAEDIC SURGERY
Payer: MEDICARE

## 2024-05-20 DIAGNOSIS — Z74.09 IMPAIRED FUNCTIONAL MOBILITY, BALANCE, GAIT, AND ENDURANCE: Primary | ICD-10-CM

## 2024-05-20 DIAGNOSIS — Z96.641 STATUS POST RIGHT HIP REPLACEMENT: ICD-10-CM

## 2024-05-20 PROCEDURE — 97110 THERAPEUTIC EXERCISES: CPT | Mod: PN

## 2024-05-20 PROCEDURE — 97161 PT EVAL LOW COMPLEX 20 MIN: CPT | Mod: PN

## 2024-05-20 NOTE — PLAN OF CARE
"[facility]  Physical Therapy Initial Evaluation    Date: 5/20/2024   Name: Michael Lennon  Clinic Number: [id number]    Therapy Diagnosis:   Encounter Diagnoses   Name Primary?    Status post right hip replacement     Impaired functional mobility, balance, gait, and endurance Yes     Physician: Adarsh Clark MD    Physician orders: PT Eval and Treat   Medical diagnosis from referral: Z96.641 (ICD-10-CM) - Status post right hip replacement   Evaluation date: 5/20/2024  Authorization period expiration: 5/3/25  Plan of care expiration: 8/29/2024  Reassessment due: 6/29/2024   Visit # / visits authorized: 1/1    Precautions: Standard    R OLAYINKA, DOS: 5/1/24    Time In: 700  Time Out: 745  Total Appointment Time (timed & untimed codes): 45 minutes    Subjective   Date of onset: 5/1/24  History of current condition - Michael reports that he had his R hip replaced at the beginning of the month. He reports no complications and has been working well with home health.     Pain:  Current 1/10, worst 3/10, best 0/10   Location: R lateral hip  Description: ache  Aggravating Factors: getting up first thing in the morning  Easing Factors: movement    Prior Therapy: PT for TKAS  Social History: 1 LAURENT to enter   Occupation: retired  Prior Level of Function: independent  Current Level of Function: Mod (I) with SPC    Pts goals: "Be able to walk down the street to daughter's house, and eventually walk down the block"     Imaging: X-ray   See chart     Medical History:   Past Medical History:   Diagnosis Date    Anticoagulant long-term use     Cancer     skin on face    Diabetes mellitus     Hypercholesteremia     Hypertension     Paroxysmal atrial fibrillation        Surgical History:   Michael Lennon  has a past surgical history that includes Knee surgery (01/01/2003); Knee surgery (01/01/2010); Appendectomy; Disc removal (01/01/1970); Ablation; and Total replacement of hip joint using computer-assisted navigation " (Right, 5/1/2024).    Medications:   Michael has a current medication list which includes the following prescription(s): acetaminophen, amlodipine, apixaban, clonidine, docusate sodium, hydrochlorothiazide, ketoconazole, lasix, lisinopril, metformin, mitigare, oxycodone, and potassium chloride, and the following Facility-Administered Medications: sodium chloride 0.9%, acetaminophen, acetaminophen, aspirin, celecoxib, famotidine, fentanyl, lidocaine (pf) 10 mg/ml (1%), methocarbamol, morphine, naloxone, ondansetron, oxycodone, oxycodone, polyethylene glycol, pregabalin capsule 75 mg, prochlorperazine, and senna-docusate 8.6-50 mg.    Allergies:   Review of patient's allergies indicates:  No Known Allergies       Objective     Wound/ incision: WFL    Posture Alignment: slouched posture    Sensation: intact to light touch    DTR: intact to light touch    GAIT DEVIATIONS: Michael displays step through gait pattern with SPC. Decreased step length, increased HOMER    Range of Motion:   Hip Left active   Flexion 120   Abduction 40   Adduction 20     Hip Right active   Flexion 90   Abduction 30   Adduction 15       Lower Extremity Strength   Right LE  Left LE    Knee extension: 4/5 Knee extension: 5/5   Knee flexion: 4/5 Knee flexion: 5/5   Hip flexion: 3+/5 Hip flexion: 5/5   Hip extension:  4-/5 Hip extension: 5/5   Hip abduction: 4/5 Hip abduction: 5/5   Hip adduction: 4/5 Hip adduction 5/5   Ankle dorsiflexion: 5/5 Ankle dorsiflexion: 5/5   Ankle plantarflexion: 5/5 Ankle plantarflexion: 5/5     Timed Up & Go Test (TUG)    Instructions to the patient:   From sitting in a chair, stand up, walk 3 meters, turn around, walk back, and sit down    Scoring:   Assistive Device and/or Bracing Used: Straight Cane  TUG Time: 17 seconds     Community Dwelling Older Adult = > 13.5 sec. are associated with high fall risk     30 Second sit-stand 12 seconds       Intake Outcome Measure for FOTO Hip Survey    Therapist reviewed FOTO scores for  Michael Lennon on 5/20/2024.   FOTO documents entered into [employer] - see Media section.    Intake Score: 40%  Goal Score: 72%          TREATMENT     Total Treatment time separate from Evaluation: 15 minutes    Michael received therapeutic exercises to develop strength, endurance, and ROM for 25 minutes including:    LAQ 2x10  Standing hip 3 way 2x10 ea     Home Exercises and Patient Education Provided    Education provided:   - PT role and POC  - Rationale behind treatment  - HEP    Written Home Exercises Provided: yes.  Exercises were reviewed and Michael was able to demonstrate them prior to the end of the session.  Michael demonstrated good  understanding of the education provided.     See EMR under Patient Instructions for exercises provided 5/20/2024.    Assessment   Michael is a 83 y.o. male referred to outpatient Physical Therapy with a medical diagnosis of R OLAYINKA. Pt presents with signs and symptoms following OLAYINKA including increased R hip pain, decreased knee ROM, decreased LE Strength, soft tissue dysfunction, postural imbalance,impaired joint mobility, and decreased tolerance to functional activities. Will progress patient per OLAYINKA protocol and per tolerance.     Pt prognosis is Good.   Pt will benefit from skilled outpatient Physical Therapy to address the deficits stated above and in the chart below, provide pt/family education, and to maximize pt's level of independence.     Plan of care discussed with patient: Yes  Pt's spiritual, cultural and educational needs considered and patient is agreeable to the plan of care and goals as stated below:     Anticipated Barriers for therapy: none     Medical Necessity is demonstrated by the following  History  Co-morbidities and personal factors that may impact the plan of care Co-morbidities:       Personal Factors:   no deficits     low   Examination  Body Structures and Functions, activity limitations and participation restrictions that may impact the plan of care Body Regions:    lower extremities    Body Systems:    gross symmetry  ROM  strength  balance  gait  motor control  motor learning    Participation Restrictions:   none    Activity limitations:   Learning and applying knowledge  no deficits    General Tasks and Commands  no deficits    Communication  no deficits    Mobility  no deficits    Self care  no deficits    Domestic Life  no deficits    Interactions/Relationships  no deficits    Life Areas  no deficits    Community and Social Life  no deficits         Complexity: low   Clinical Presentation stable and uncomplicated low   Decision Making/ Complexity Score: low       GOALS:     Short Term Goals: 4 weeks  - Report decreased in pain at worse less than  <   / =  0  /10  to increase tolerance for functional mobility. Appropriate and ongoing  - Pt to improve R hip range of motion by 25% to allow for improved functional mobility to allow for improvement in IADLs. Appropriate and ongoing  - Increased BLE MMT 1/2 grade to increase tolerance for ADL and work activities. Appropriate and ongoing  - Pt to improve TUG score by 50%. Appropriate and ongoing  - Pt to tolerate HEP to improve ROM and independence with ADL's. Appropriate and ongoing    Long Term Goals: 8 weeks  - Report decreased in pain at worse less than  <   / =  0  /10  to increase tolerance for functional mobility. Appropriate and ongoing  - Pt to improve range of motion by 75% to allow for improved functional mobility to allow for improvement in IADLs. Appropriate and ongoing  - Increased BLE MMT 1 grade to increase tolerance for ADL and work activities. Appropriate and ongoing  - Pt will report 72% on FOTO knee survey  to demonstrate increase in LE function with every day tasks. Appropriate and ongoing  - Pt to be independent with HEP to improve ROM and independence with ADL's. Appropriate and ongoing    Plan   Plan of care Certification: 5/20/2024 to 8/29/2024    Outpatient Physical Therapy 2 times weekly for 10  weeks to include the following interventions: Gait Training, Manual Therapy, Neuromuscular Re-ed, Patient Education, Therapeutic Activities, and Therapeutic Exercise. Tramaine Castaneda, PT      I CERTIFY THE NEED FOR THESE SERVICES FURNISHED UNDER THIS PLAN OF TREATMENT AND WHILE UNDER MY CARE   Physician's comments:     Physician's Signature: ___________________________________________________

## 2024-05-23 ENCOUNTER — CLINICAL SUPPORT (OUTPATIENT)
Dept: REHABILITATION | Facility: HOSPITAL | Age: 84
End: 2024-05-23
Payer: MEDICARE

## 2024-05-23 ENCOUNTER — EXTERNAL HOME HEALTH (OUTPATIENT)
Dept: HOME HEALTH SERVICES | Facility: HOSPITAL | Age: 84
End: 2024-05-23
Payer: MEDICARE

## 2024-05-23 ENCOUNTER — PATIENT MESSAGE (OUTPATIENT)
Dept: ORTHOPEDICS | Facility: CLINIC | Age: 84
End: 2024-05-23
Payer: MEDICARE

## 2024-05-23 DIAGNOSIS — Z74.09 IMPAIRED FUNCTIONAL MOBILITY, BALANCE, GAIT, AND ENDURANCE: Primary | ICD-10-CM

## 2024-05-23 PROCEDURE — 97110 THERAPEUTIC EXERCISES: CPT | Mod: PN

## 2024-05-23 PROCEDURE — 97530 THERAPEUTIC ACTIVITIES: CPT | Mod: PN

## 2024-05-28 ENCOUNTER — OFFICE VISIT (OUTPATIENT)
Dept: ORTHOPEDICS | Facility: CLINIC | Age: 84
End: 2024-05-28
Payer: MEDICARE

## 2024-05-28 ENCOUNTER — CLINICAL SUPPORT (OUTPATIENT)
Dept: REHABILITATION | Facility: HOSPITAL | Age: 84
End: 2024-05-28
Payer: MEDICARE

## 2024-05-28 VITALS — HEIGHT: 67 IN | BODY MASS INDEX: 33.53 KG/M2 | WEIGHT: 213.63 LBS

## 2024-05-28 DIAGNOSIS — Z96.641 STATUS POST RIGHT HIP REPLACEMENT: Primary | ICD-10-CM

## 2024-05-28 DIAGNOSIS — Z74.09 IMPAIRED FUNCTIONAL MOBILITY, BALANCE, GAIT, AND ENDURANCE: Primary | ICD-10-CM

## 2024-05-28 PROCEDURE — 1159F MED LIST DOCD IN RCRD: CPT | Mod: CPTII,S$GLB,, | Performed by: ORTHOPAEDIC SURGERY

## 2024-05-28 PROCEDURE — 99024 POSTOP FOLLOW-UP VISIT: CPT | Mod: S$GLB,,, | Performed by: ORTHOPAEDIC SURGERY

## 2024-05-28 PROCEDURE — 99999 PR PBB SHADOW E&M-EST. PATIENT-LVL III: CPT | Mod: PBBFAC,,, | Performed by: ORTHOPAEDIC SURGERY

## 2024-05-28 PROCEDURE — 97530 THERAPEUTIC ACTIVITIES: CPT | Mod: PN

## 2024-05-28 PROCEDURE — 97110 THERAPEUTIC EXERCISES: CPT | Mod: PN

## 2024-05-28 PROCEDURE — 1126F AMNT PAIN NOTED NONE PRSNT: CPT | Mod: CPTII,S$GLB,, | Performed by: ORTHOPAEDIC SURGERY

## 2024-05-28 NOTE — PROGRESS NOTES
"Ortho Hip Follow Up Note    PCP: Kalin Cobos MD   Referring Provider: No referring provider defined for this encounter.     Assessment:  83 y.o. male status post right Total Hip Primary completed on 5/1/24. Doing well.  In outpatient therapy.  On a cane.  Preop pain is gone.  He has improvement in his range of motion particularly adduction.    Plan:  Follow up 3 months   Future Radiographs Indicated at next visit: No    Pain Management: Continue current pain management  Anticoagulation: Continue ASA for total of 4 weeks duration post operatively  Wound Care: Ok to shower. No scrubbing incision. No soaking in pools or tubs for 6 weeks post operative.     Patient Reassurance:   Post-operative course discussed with patient. Patient reassured and supported. All questions answered.    ACTIVE PROBLEM LIST  There is no problem list on file for this patient.        HPI:  Michael Lennon presents today for a post-op visit.    STATUS POST:  left Total Hip Primary  BMI: Body mass index is 33.46 kg/m².    Post operative recovery was complicated by: None    Patient rates his condition as improving. Pleased with surgical outcome to date.     Functional Assessment:  Started Outpatient PT  Functional Difficulties:  Arising from chair  Pain Medication:  Non-Narcotic  Anticoagulation: Aspirin     EXAM: POST OP HIP    right POST-OPERATIVE HIP    Ht 5' 7" (1.702 m)   Wt 96.9 kg (213 lb 10 oz)   BMI 33.46 kg/m²     Skin:  Appropriate post op appearance, No evidence of erythema, warmth, discharge, or drainage, and Incision clean/dry/intact  Range of Motion: Pain Free  Neurovascular Status: Sensation intact in Sural, Saphenous, SPN, DPN and Tibial nerve distribution. 5 out of 5 strength in hip flexion, knee flexion/extension, ankle plantarflexion/dorsiflexion. 2+ dorsalis pedis    Hip Imaging:  Implants are well fixed. There is no evidence of loosening. No dislocation.   Estimated Anteversion: 30  Estimate Abduction: 50  Estimated " LLD: 2mm

## 2024-05-29 PROBLEM — Z74.09 IMPAIRED FUNCTIONAL MOBILITY, BALANCE, GAIT, AND ENDURANCE: Status: ACTIVE | Noted: 2024-05-29

## 2024-05-30 ENCOUNTER — CLINICAL SUPPORT (OUTPATIENT)
Dept: REHABILITATION | Facility: HOSPITAL | Age: 84
End: 2024-05-30
Payer: MEDICARE

## 2024-05-30 DIAGNOSIS — Z74.09 IMPAIRED FUNCTIONAL MOBILITY, BALANCE, GAIT, AND ENDURANCE: Primary | ICD-10-CM

## 2024-05-30 PROCEDURE — 97110 THERAPEUTIC EXERCISES: CPT | Mod: PN

## 2024-05-30 NOTE — PROGRESS NOTES
"[facility]   Physical Therapy Treatment Note     Name: Michael Lennon  Clinic Number: [id number]    Therapy Diagnosis:   Encounter Diagnosis   Name Primary?    Impaired functional mobility, balance, gait, and endurance Yes     Physician: Adarsh Clark MD    Visit Date: 5/23/2024    Physician orders: PT Eval and Treat   Medical diagnosis from referral: Z96.641 (ICD-10-CM) - Status post right hip replacement   Evaluation date: 5/20/2024  Authorization period expiration: 5/3/25  Plan of care expiration: 8/29/2024  Reassessment due: 6/29/2024   Visit # / visits authorized: 1/10 + eval    FOTO: 1/ 1  PTA Visit #: 0/5     Precautions: standard    Time In: 700  Time Out: 800  Total Billable Time: 23 minutes (1:1)    SUBJECTIVE     Pt reports that he is doing well today. No complications since eval.    He was compliant with home exercise program.  Response to previous treatment: good  Functional change: none yet    Pain: 0/10  Location: R hip     OBJECTIVE     Objective Measures updated at progress report unless specified.     TREATMENT     Michael received the treatments listed below:      received therapeutic exercises to develop strength and ROM for 45  minutes including:    Standing hip 3 way 3x10 ea   STS 20 in box 3x10  Step ups 6" step 3x10  Standing hamstring stretch on step 4x30"  Standing gastroc stretch on wedge 4x30"  NBOS 4x30"   LAQ 3x10  HL hip adduction with ball 3x10x3"    Michael participated in dynamic functional therapeutic activities to improve functional performance and simulate household and community activities for 10 minutes, including:    Nustep 10'       PATIENT EDUCATION AND HOME EXERCISES     Home Exercises Provided and Patient Education Provided     Education provided:   - PT role and POC  - HEP    Written Home Exercises Provided: Patient instructed to cont prior HEP. Exercises were reviewed and Michael was able to demonstrate them prior to the end of the session.  Michael " demonstrated good  understanding of the education provided. See EMR under Patient Instructions for exercises provided during therapy sessions    ASSESSMENT     Michael tolerated treatment well today. Presents to clinic reporting no issues since initial evaluation. Initiated treatment by continuing HEP and adding exercises to improve hip stability and mobility. Tolerated progressions well today. Will continue to progress treatment per patient tolerance.      Michael Is progressing well towards his goals.   Pt prognosis is Good.     Pt will continue to benefit from skilled outpatient physical therapy to address the deficits listed in the problem list box on initial evaluation, provide pt/family education and to maximize pt's level of independence in the home and community environment.     Pt's spiritual, cultural and educational needs considered and pt agreeable to plan of care and goals.     Anticipated barriers to physical therapy: none     Goals:   Short Term Goals: 4 weeks  - Report decreased in pain at worse less than  <   / =  0  /10  to increase tolerance for functional mobility. Appropriate and ongoing  - Pt to improve R hip range of motion by 25% to allow for improved functional mobility to allow for improvement in IADLs. Appropriate and ongoing  - Increased BLE MMT 1/2 grade to increase tolerance for ADL and work activities. Appropriate and ongoing  - Pt to improve TUG score by 50%. Appropriate and ongoing  - Pt to tolerate HEP to improve ROM and independence with ADL's. Appropriate and ongoing     Long Term Goals: 8 weeks  - Report decreased in pain at worse less than  <   / =  0  /10  to increase tolerance for functional mobility. Appropriate and ongoing  - Pt to improve range of motion by 75% to allow for improved functional mobility to allow for improvement in IADLs. Appropriate and ongoing  - Increased BLE MMT 1 grade to increase tolerance for ADL and work activities. Appropriate and ongoing  - Pt will report  72% on FOTO knee survey  to demonstrate increase in LE function with every day tasks. Appropriate and ongoing  - Pt to be independent with HEP to improve ROM and independence with ADL's. Appropriate and ongoing    PLAN     Continue POC.    Cruz Castaneda, PT

## 2024-06-04 ENCOUNTER — CLINICAL SUPPORT (OUTPATIENT)
Dept: REHABILITATION | Facility: HOSPITAL | Age: 84
End: 2024-06-04
Payer: MEDICARE

## 2024-06-04 DIAGNOSIS — Z74.09 IMPAIRED FUNCTIONAL MOBILITY, BALANCE, GAIT, AND ENDURANCE: Primary | ICD-10-CM

## 2024-06-04 PROCEDURE — 97110 THERAPEUTIC EXERCISES: CPT | Mod: KX,PN

## 2024-06-04 PROCEDURE — 97530 THERAPEUTIC ACTIVITIES: CPT | Mod: KX,PN

## 2024-06-04 NOTE — PROGRESS NOTES
"[facility]   Physical Therapy Treatment Note     Name: Michael Lennon  Clinic Number: [id number]    Therapy Diagnosis:   Encounter Diagnosis   Name Primary?    Impaired functional mobility, balance, gait, and endurance Yes     Physician: Adarsh Clark MD    Visit Date: 5/30/2024    Physician orders: PT Eval and Treat   Medical diagnosis from referral: Z96.641 (ICD-10-CM) - Status post right hip replacement   Evaluation date: 5/20/2024  Authorization period expiration: 5/3/25  Plan of care expiration: 8/29/2024  Reassessment due: 6/29/2024   Visit # / visits authorized: 3/10 + eval    FOTO: 1/ 1  PTA Visit #: 0/5     Precautions: standard    Time In: 1400  Time Out: 1500  Total Billable Time: 53 minutes (1:1)    SUBJECTIVE     Pt reports no new issues. Ambulating independently.     He was compliant with home exercise program.  Response to previous treatment: good  Functional change: none yet    Pain: 0/10  Location: R hip     OBJECTIVE     Objective Measures updated at progress report unless specified.     TREATMENT     Michael received the treatments listed below:      received therapeutic exercises to develop strength and ROM for 45  minutes including:    Standing hip 3 way 3x10 ea   STS 20 in box 3x10  Step ups 6" step 3x10  Standing hamstring stretch on step 4x30"  Standing gastroc stretch on wedge 4x30"  NBOS 4x30"   LAQ 3x10  HL hip adduction with ball 3x10x3"  +Matrix hamstring curls 3x10 20#  +Matrix knee extension 3x10 15#    Michael participated in dynamic functional therapeutic activities to improve functional performance and simulate household and community activities for 10 minutes, including:    Nustep 10'       PATIENT EDUCATION AND HOME EXERCISES     Home Exercises Provided and Patient Education Provided     Education provided:   - PT role and POC  - HEP    Written Home Exercises Provided: Patient instructed to cont prior HEP. Exercises were reviewed and Michael was able to demonstrate " them prior to the end of the session.  Michael demonstrated good  understanding of the education provided. See EMR under Patient Instructions for exercises provided during therapy sessions    ASSESSMENT     Michael tolerated treatment well today. Presents to clinic reporting no new issues and ambulating without AD. Continued building strength to assist in safe functional mobility. Progressing well per [facility] protocol. Will continue to progress treatment per patient tolerance.      Michael Is progressing well towards his goals.   Pt prognosis is Good.     Pt will continue to benefit from skilled outpatient physical therapy to address the deficits listed in the problem list box on initial evaluation, provide pt/family education and to maximize pt's level of independence in the home and community environment.     Pt's spiritual, cultural and educational needs considered and pt agreeable to plan of care and goals.     Anticipated barriers to physical therapy: none     Goals:   Short Term Goals: 4 weeks  - Report decreased in pain at worse less than  <   / =  0  /10  to increase tolerance for functional mobility. Appropriate and ongoing  - Pt to improve R hip range of motion by 25% to allow for improved functional mobility to allow for improvement in IADLs. Appropriate and ongoing  - Increased BLE MMT 1/2 grade to increase tolerance for ADL and work activities. Appropriate and ongoing  - Pt to improve TUG score by 50%. Appropriate and ongoing  - Pt to tolerate HEP to improve ROM and independence with ADL's. Appropriate and ongoing     Long Term Goals: 8 weeks  - Report decreased in pain at worse less than  <   / =  0  /10  to increase tolerance for functional mobility. Appropriate and ongoing  - Pt to improve range of motion by 75% to allow for improved functional mobility to allow for improvement in IADLs. Appropriate and ongoing  - Increased BLE MMT 1 grade to increase tolerance for ADL and work activities. Appropriate and  ongoing  - Pt will report 72% on FOTO knee survey  to demonstrate increase in LE function with every day tasks. Appropriate and ongoing  - Pt to be independent with HEP to improve ROM and independence with ADL's. Appropriate and ongoing    PLAN     Continue POC.    Cruz Castaneda, PT

## 2024-06-04 NOTE — PROGRESS NOTES
"[facility]   Physical Therapy Treatment Note     Name: Micheal Lennon  Clinic Number: [id number]    Therapy Diagnosis:   Encounter Diagnosis   Name Primary?    Impaired functional mobility, balance, gait, and endurance Yes     Physician: Adarsh Clark MD    Visit Date: 6/4/2024    Physician orders: PT Eval and Treat   Medical diagnosis from referral: Z96.641 (ICD-10-CM) - Status post right hip replacement   Evaluation date: 5/20/2024  Authorization period expiration: 5/3/25  Plan of care expiration: 8/29/2024  Reassessment due: 6/29/2024   Visit # / visits authorized: 3/10 + eval    FOTO: 1/ 1  PTA Visit #: 0/5     Precautions: standard    Time In: 1410  Time Out: 1510  Total Billable Time: 40 minutes     SUBJECTIVE     Pt reports he continues to improve with therapy.     He was compliant with home exercise program.  Response to previous treatment: good  Functional change: none yet    Pain: 0/10  Location: R hip     OBJECTIVE     Objective Measures updated at progress report unless specified.     TREATMENT     Michael received the treatments listed below:      received therapeutic exercises to develop strength and ROM for 25  minutes including:    Standing hip 3 way 3x10 ea   Standing gastroc stretch on wedge 4x30"  LAQ 3x10 3lbs   HL hip adduction with ball 3x10x3"  Hip flexor stretch 20 sec x 4  PPT x 20  Hip fall out x 20    Not performed:   +Matrix hamstring curls 3x10 20#  +Matrix knee extension 3x10 15#    Manual PROM, right hip in all planes. X 5 min    Michael participated in dynamic functional therapeutic activities to improve functional performance and simulate household and community activities for 25 minutes, including:    Nustep 10'   Heel raises 3 x 10  STS 20 in box 3x10  Step ups 6" step 3x10      PATIENT EDUCATION AND HOME EXERCISES     Home Exercises Provided and Patient Education Provided     Education provided:   - PT role and POC  - HEP    Written Home Exercises Provided: " Patient instructed to cont prior HEP. Exercises were reviewed and Michael was able to demonstrate them prior to the end of the session.  Michael demonstrated good  understanding of the education provided. See EMR under Patient Instructions for exercises provided during therapy sessions    ASSESSMENT     Pt presents ambulating without an AD today, awkward gait, bilateral trunk lean, flexed posture. Progress activities consisting of hip stabilization exercises with good performance. No c/o increased discomfort with prescribed activities.  Good response to exercise progression.      Michael Is progressing well towards his goals.   Pt prognosis is Good.     Pt will continue to benefit from skilled outpatient physical therapy to address the deficits listed in the problem list box on initial evaluation, provide pt/family education and to maximize pt's level of independence in the home and community environment.     Pt's spiritual, cultural and educational needs considered and pt agreeable to plan of care and goals.     Anticipated barriers to physical therapy: none     Goals:   Short Term Goals: 4 weeks  - Report decreased in pain at worse less than  <   / =  0  /10  to increase tolerance for functional mobility. Appropriate and ongoing  - Pt to improve R hip range of motion by 25% to allow for improved functional mobility to allow for improvement in IADLs. Appropriate and ongoing  - Increased BLE MMT 1/2 grade to increase tolerance for ADL and work activities. Appropriate and ongoing  - Pt to improve TUG score by 50%. Appropriate and ongoing  - Pt to tolerate HEP to improve ROM and independence with ADL's. Appropriate and ongoing     Long Term Goals: 8 weeks  - Report decreased in pain at worse less than  <   / =  0  /10  to increase tolerance for functional mobility. Appropriate and ongoing  - Pt to improve range of motion by 75% to allow for improved functional mobility to allow for improvement in IADLs. Appropriate and  ongoing  - Increased BLE MMT 1 grade to increase tolerance for ADL and work activities. Appropriate and ongoing  - Pt will report 72% on FOTO knee survey  to demonstrate increase in LE function with every day tasks. Appropriate and ongoing  - Pt to be independent with HEP to improve ROM and independence with ADL's. Appropriate and ongoing    PLAN     Continue POC.    Benji Sommers, PT

## 2024-06-06 ENCOUNTER — CLINICAL SUPPORT (OUTPATIENT)
Dept: REHABILITATION | Facility: HOSPITAL | Age: 84
End: 2024-06-06
Payer: MEDICARE

## 2024-06-06 DIAGNOSIS — Z74.09 IMPAIRED FUNCTIONAL MOBILITY, BALANCE, GAIT, AND ENDURANCE: Primary | ICD-10-CM

## 2024-06-06 PROCEDURE — 97530 THERAPEUTIC ACTIVITIES: CPT | Mod: KX,PN

## 2024-06-06 NOTE — PROGRESS NOTES
"[facility]   Physical Therapy Treatment Note     Name: Michael Lennon  Clinic Number: [id number]    Therapy Diagnosis:   Encounter Diagnosis   Name Primary?    Impaired functional mobility, balance, gait, and endurance Yes       Physician: Adarsh Clark MD    Visit Date: 6/6/2024    Physician orders: PT Eval and Treat   Medical diagnosis from referral: Z96.641 (ICD-10-CM) - Status post right hip replacement   Evaluation date: 5/20/2024  Authorization period expiration: 5/3/25  Plan of care expiration: 8/29/2024  Reassessment due: 6/29/2024   Visit # / visits authorized: 4/10 + eval    FOTO: 1/ 1  PTA Visit #: 0/5     Precautions: standard    Time In: 11:00 am  Time Out: 11:40 am  Total Billable Time: 40 minutes     SUBJECTIVE     Pt reports that he does not have any pain. Pt states that he is feeling better.     He was compliant with home exercise program.  Response to previous treatment: good  Functional change: none yet    Pain: 0/10  Location: R hip     OBJECTIVE     Objective Measures updated at progress report unless specified.     TREATMENT     Michael received the treatments listed below:      received therapeutic exercises to develop strength and ROM for 25  minutes including:    Standing hip 3 way 3x10 ea   Standing gastroc stretch on wedge 4x30"  LAQ 3x10 3lbs   HL hip adduction with ball 3x10x3"  Hip flexor stretch 20 sec x 4  PPT x 20  Hip fall out x 20  +Matrix hamstring curls 3x10 25#  +Matrix knee extension 3x10 10#    Manual PROM, right hip in all planes. X 5 min    Michael participated in dynamic functional therapeutic activities to improve functional performance and simulate household and community activities for 25 minutes, including:    Nustep 10'   Heel raises 3 x 10  STS 20 in box 3x10  Step ups 6" step 3x10      PATIENT EDUCATION AND HOME EXERCISES     Home Exercises Provided and Patient Education Provided     Education provided:   - PT role and POC  - HEP    Written Home " Exercises Provided: Patient instructed to cont prior HEP. Exercises were reviewed and Michael was able to demonstrate them prior to the end of the session.  Michael demonstrated good  understanding of the education provided. See EMR under Patient Instructions for exercises provided during therapy sessions    ASSESSMENT     Pt presents ambulating without an AD today, awkward gait, bilateral trunk lean, flexed posture. Pt is about 5 weeks and 1 days since surgery. We cont with same hip stabilization exercises with good performance. Pt required minor v/c's to perform exercises. Pt cont with decrease R quad and gluteus muscles motor control. No c/o increased discomfort with prescribed activities.  Good response to exercise progression.      Michael Is progressing well towards his goals.   Pt prognosis is Good.     Pt will continue to benefit from skilled outpatient physical therapy to address the deficits listed in the problem list box on initial evaluation, provide pt/family education and to maximize pt's level of independence in the home and community environment.     Pt's spiritual, cultural and educational needs considered and pt agreeable to plan of care and goals.     Anticipated barriers to physical therapy: none     Goals:   Short Term Goals: 4 weeks  - Report decreased in pain at worse less than  <   / =  0  /10  to increase tolerance for functional mobility. Appropriate and ongoing  - Pt to improve R hip range of motion by 25% to allow for improved functional mobility to allow for improvement in IADLs. Appropriate and ongoing  - Increased BLE MMT 1/2 grade to increase tolerance for ADL and work activities. Appropriate and ongoing  - Pt to improve TUG score by 50%. Appropriate and ongoing  - Pt to tolerate HEP to improve ROM and independence with ADL's. Appropriate and ongoing     Long Term Goals: 8 weeks  - Report decreased in pain at worse less than  <   / =  0  /10  to increase tolerance for functional mobility.  Appropriate and ongoing  - Pt to improve range of motion by 75% to allow for improved functional mobility to allow for improvement in IADLs. Appropriate and ongoing  - Increased BLE MMT 1 grade to increase tolerance for ADL and work activities. Appropriate and ongoing  - Pt will report 72% on FOTO knee survey  to demonstrate increase in LE function with every day tasks. Appropriate and ongoing  - Pt to be independent with HEP to improve ROM and independence with ADL's. Appropriate and ongoing    PLAN     Continue POC.    Jay Holley, PT

## 2024-06-10 ENCOUNTER — CLINICAL SUPPORT (OUTPATIENT)
Dept: REHABILITATION | Facility: HOSPITAL | Age: 84
End: 2024-06-10
Payer: MEDICARE

## 2024-06-10 DIAGNOSIS — Z74.09 IMPAIRED FUNCTIONAL MOBILITY, BALANCE, GAIT, AND ENDURANCE: Primary | ICD-10-CM

## 2024-06-10 PROCEDURE — 97110 THERAPEUTIC EXERCISES: CPT | Mod: KX,PN

## 2024-06-10 PROCEDURE — 97530 THERAPEUTIC ACTIVITIES: CPT | Mod: KX,PN

## 2024-06-10 NOTE — PROGRESS NOTES
"[facility]   Physical Therapy Treatment Note     Name: Michael Lennon  Clinic Number: [id number]    Therapy Diagnosis:   Encounter Diagnosis   Name Primary?    Impaired functional mobility, balance, gait, and endurance Yes       Physician: Adarsh Clark MD    Visit Date: 6/10/2024    Physician orders: PT Eval and Treat   Medical diagnosis from referral: Z96.641 (ICD-10-CM) - Status post right hip replacement   Evaluation date: 5/20/2024  Authorization period expiration: 5/3/25  Plan of care expiration: 8/29/2024  Reassessment due: 6/29/2024   Visit # / visits authorized: 4/10 + eval    FOTO: 1/ 1  PTA Visit #: 0/5     Precautions: standard    Time In: 0815  Time Out: 0910  Total Billable Time: 55 minutes     SUBJECTIVE     Pt the hip continues to improve with therapy.     He was compliant with home exercise program.  Response to previous treatment: good  Functional change: none yet    Pain: 0/10  Location: R hip     OBJECTIVE     Objective Measures updated at progress report unless specified.     TREATMENT     Michael received the treatments listed below:      received therapeutic exercises to develop strength and ROM for 25  minutes including:    Standing hip 3 way 3x10 ea   Standing gastroc stretch on wedge 4x30"  LAQ 3x10 3lbs   HL hip adduction with ball 3x10x3"  Hip flexor stretch 20 sec x 4  PPT x 20  Hip fall out x 20  Matrix hamstring curls 3x10 25#  Matrix knee extension 3x10 10#  Bridge with RTB 3 x 10    Manual PROM, right hip in all planes. X 5 min    Michael participated in dynamic functional therapeutic activities to improve functional performance and simulate household and community activities for 25 minutes, including:    Nustep 10'   Heel raises 3 x 10  STS 20 in box 3x10  Step ups 6" step 3x10      PATIENT EDUCATION AND HOME EXERCISES     Home Exercises Provided and Patient Education Provided     Education provided:   - PT role and POC  - HEP    Written Home Exercises Provided: " Patient instructed to cont prior HEP. Exercises were reviewed and Michael was able to demonstrate them prior to the end of the session.  Michael demonstrated good  understanding of the education provided. See EMR under Patient Instructions for exercises provided during therapy sessions    ASSESSMENT     Pt presents ambulating without an AD today, awkward gait, bilateral trunk lean, flexed posture. Progress activities consisting of hip stabilization exercises with good performance. No c/o increased discomfort with prescribed activities.  Good response to exercise progression.      Michael Is progressing well towards his goals.   Pt prognosis is Good.     Pt will continue to benefit from skilled outpatient physical therapy to address the deficits listed in the problem list box on initial evaluation, provide pt/family education and to maximize pt's level of independence in the home and community environment.     Pt's spiritual, cultural and educational needs considered and pt agreeable to plan of care and goals.     Anticipated barriers to physical therapy: none     Goals:   Short Term Goals: 4 weeks  - Report decreased in pain at worse less than  <   / =  0  /10  to increase tolerance for functional mobility. Appropriate and ongoing  - Pt to improve R hip range of motion by 25% to allow for improved functional mobility to allow for improvement in IADLs. Appropriate and ongoing  - Increased BLE MMT 1/2 grade to increase tolerance for ADL and work activities. Appropriate and ongoing  - Pt to improve TUG score by 50%. Appropriate and ongoing  - Pt to tolerate HEP to improve ROM and independence with ADL's. Appropriate and ongoing     Long Term Goals: 8 weeks  - Report decreased in pain at worse less than  <   / =  0  /10  to increase tolerance for functional mobility. Appropriate and ongoing  - Pt to improve range of motion by 75% to allow for improved functional mobility to allow for improvement in IADLs. Appropriate and  ongoing  - Increased BLE MMT 1 grade to increase tolerance for ADL and work activities. Appropriate and ongoing  - Pt will report 72% on FOTO knee survey  to demonstrate increase in LE function with every day tasks. Appropriate and ongoing  - Pt to be independent with HEP to improve ROM and independence with ADL's. Appropriate and ongoing    PLAN     Continue POC.    Benji Sommers, PT

## 2024-06-20 ENCOUNTER — CLINICAL SUPPORT (OUTPATIENT)
Dept: REHABILITATION | Facility: HOSPITAL | Age: 84
End: 2024-06-20
Payer: MEDICARE

## 2024-06-20 DIAGNOSIS — Z74.09 IMPAIRED FUNCTIONAL MOBILITY, BALANCE, GAIT, AND ENDURANCE: Primary | ICD-10-CM

## 2024-06-20 PROCEDURE — 97530 THERAPEUTIC ACTIVITIES: CPT | Mod: KX,PN

## 2024-06-20 PROCEDURE — 97110 THERAPEUTIC EXERCISES: CPT | Mod: KX,PN

## 2024-06-20 NOTE — PROGRESS NOTES
"[facility]   Physical Therapy Discharge Note     Name: Michael Lennon  Clinic Number: [id number]    Therapy Diagnosis:   Encounter Diagnosis   Name Primary?    Impaired functional mobility, balance, gait, and endurance Yes       Physician: Adarsh Clark MD    Visit Date: 6/20/2024    Physician orders: PT Eval and Treat   Medical diagnosis from referral: Z96.641 (ICD-10-CM) - Status post right hip replacement   Evaluation date: 5/20/2024  Authorization period expiration: 5/3/25  Plan of care expiration: 8/29/2024  Reassessment due: 6/29/2024   Visit # / visits authorized: 4/10 + eval    FOTO: 1/ 1  PTA Visit #: 0/5     Precautions: standard    Time In: 1105  Time Out: 1200    Total Billable Time: 55 minutes     SUBJECTIVE     Pt the hip continues to improve with therapy.     He was compliant with home exercise program.  Response to previous treatment: good  Functional change: none yet    Pain: 0/10  Location: R hip     OBJECTIVE     Objective Measures updated at progress report unless specified.     Range of Motion:   Hip Left active   Flexion 120   Abduction 40   Adduction 20      Hip Right active   Flexion 95   Abduction 35             Lower Extremity Strength   Right LE   Left LE     Knee extension: 4/5 Knee extension: 5/5   Knee flexion: 4/5 Knee flexion: 5/5   Hip flexion: 4-/5 Hip flexion: 5/5   Hip extension:  4-/5 Hip extension: 5/5   Hip abduction: 4/5 Hip abduction: 5/5   Hip adduction: 4/5 Hip adduction 5/5   Ankle dorsiflexion: 5/5 Ankle dorsiflexion: 5/5   Ankle plantarflexion: 5/5 Ankle plantarflexion: 5/5        TREATMENT     Michael received the treatments listed below:      received therapeutic exercises to develop strength and ROM for 30  minutes including:    Standing hip 3 way 3x10 ea   Standing gastroc stretch on wedge 4x30"  LAQ 3x10 3lbs   HL hip adduction with ball 3x10x3"  Hip flexor stretch 20 sec x 4  PPT x 20  Hip fall out x 20  Matrix hamstring curls 3x10 " "25#  Matrix knee extension 3x10 10#  Bridge with RTB 3 x 10    Manual PROM, right hip in all planes. X 5 min    Michael participated in dynamic functional therapeutic activities to improve functional performance and simulate household and community activities for 25 minutes, including:    Nustep 10'   Heel raises 3 x 10  STS 20 in box 3x10  Step ups 6" step 3x10      PATIENT EDUCATION AND HOME EXERCISES     Home Exercises Provided and Patient Education Provided     Education provided:   - PT role and POC  - HEP    Written Home Exercises Provided: Patient instructed to cont prior HEP. Exercises were reviewed and Michael was able to demonstrate them prior to the end of the session.  Michael demonstrated good  understanding of the education provided. See EMR under Patient Instructions for exercises provided during therapy sessions    ASSESSMENT     Goals:      Long Term Goals: 8 weeks  MET  - Report decreased in pain at worse less than  <   / =  0  /10  to increase tolerance for functional mobility.   - Pt to improve range of motion by 75% to allow for improved functional mobility to allow for improvement in IADLs.  - Increased BLE MMT 1 grade to increase tolerance for ADL and work activities.   - Pt will report 72% on FOTO knee survey  to demonstrate increase in LE function with every day tasks. Appropriate and ongoing  - Pt to be independent with HEP to improve ROM and independence with ADL's.     Mr. Lennon has attended 8 sessions in our clinic beginning 5/20/24 for PT consisting of manual therapy, modalities, ROM activities, therex and HEP instruction. The patient has responded well to physical therapy intervention. Demonstrates a good understanding of home program. Patient will discharged secondary to diminished complaints and goal achievement.      PLAN   D/c to comprehensive home program. Pt to follow up with MD if further therapy is warranted.      Benji Sommers, PT                 "

## 2024-06-24 ENCOUNTER — DOCUMENTATION ONLY (OUTPATIENT)
Dept: REHABILITATION | Facility: HOSPITAL | Age: 84
End: 2024-06-24
Payer: MEDICARE

## 2024-07-29 ENCOUNTER — OFFICE VISIT (OUTPATIENT)
Dept: ORTHOPEDICS | Facility: CLINIC | Age: 84
End: 2024-07-29
Payer: MEDICARE

## 2024-07-29 VITALS — WEIGHT: 213.63 LBS | HEIGHT: 67 IN | BODY MASS INDEX: 33.53 KG/M2

## 2024-07-29 DIAGNOSIS — Z96.641 STATUS POST RIGHT HIP REPLACEMENT: Primary | ICD-10-CM

## 2024-07-29 DIAGNOSIS — M70.61 TROCHANTERIC BURSITIS OF RIGHT HIP: ICD-10-CM

## 2024-07-29 PROCEDURE — 99999 PR PBB SHADOW E&M-EST. PATIENT-LVL III: CPT | Mod: PBBFAC,,, | Performed by: ORTHOPAEDIC SURGERY

## 2024-07-29 RX ORDER — TRIAMCINOLONE ACETONIDE 40 MG/ML
40 INJECTION, SUSPENSION INTRA-ARTICULAR; INTRAMUSCULAR
Status: DISCONTINUED | OUTPATIENT
Start: 2024-07-29 | End: 2024-07-29 | Stop reason: HOSPADM

## 2024-07-29 RX ADMIN — TRIAMCINOLONE ACETONIDE 40 MG: 40 INJECTION, SUSPENSION INTRA-ARTICULAR; INTRAMUSCULAR at 08:07

## 2024-07-29 NOTE — PROGRESS NOTES
Ortho Hip Follow Up Note    PCP: Kalin Cobos MD   Referring Provider: No referring provider defined for this encounter.     Assessment:  84 y.o. male status post right Total Hip Primary completed on 5/1/24. Patient has complaints of pain today.  This is in contrast to his three-week visit of me which he stated his preop pain was gone, he was on a cane.  In subsequent follow up physical therapy notes he had 0/10 pain and was last seen over a month ago.  He was reporting improvement in his motion of his hip.  Today he is reporting a lateral based right hip pain that has been present since surgery.  It is start-up pain, difficulty lying in his side, tenderness to touch.  He reports some intermittent radiating groin pain but the focuses more lateral hip based.  Clinically he does not have any pain with internal or external rotation of his hip and he maintains good range of motion.  He does have significant tenderness over his greater trochanter with the extent into the gluteal tendons.  I think this would be more consistent with a trochanteric bursitis or gluteal tendinitis.  For this we discussed oral medications, injection, more physical therapy.  He wants to do a home exercise program in an injection today.  I am going to follow up with him in a month and get repeat radiographs to ensure no hardware issue but I am less concerned with this relative to the lack of pain with hip motion.    Plan:  Follow up 1 months   Future Radiographs Indicated at next visit: No    Pain Management: Tylenol  Anticoagulation: None  Wound Care: None    Patient Reassurance:   Post-operative course discussed with patient. Patient reassured and supported. All questions answered.    ACTIVE PROBLEM LIST  Patient Active Problem List   Diagnosis    Impaired functional mobility, balance, gait, and endurance         HPI:  Michael VIELKA Heathan presents today for a post-op visit.    STATUS POST:  left Total Hip Primary  BMI: Body mass index is 33.46  "kg/m².    Post operative recovery was complicated by: None    Patient rates his condition as improving. Pleased with surgical outcome to date.     Functional Assessment:  Completed PT  Functional Difficulties:  Arising from chair  Pain Medication:  Non-Narcotic  Anticoagulation: None    EXAM: POST OP HIP    right POST-OPERATIVE HIP    Ht 5' 7" (1.702 m)   Wt 96.9 kg (213 lb 10 oz)   BMI 33.46 kg/m²     Skin:  Appropriate post op appearance, No evidence of erythema, warmth, discharge, or drainage, and Incision clean/dry/intact  Range of Motion: Pain Free  TTP over greater trochanter and gluteal tendons  Neurovascular Status: Sensation intact in Sural, Saphenous, SPN, DPN and Tibial nerve distribution. 5 out of 5 strength in hip flexion, knee flexion/extension, ankle plantarflexion/dorsiflexion. 2+ dorsalis pedis    Hip Imaging:  Implants are well fixed. There is no evidence of loosening. No dislocation.   Estimated Anteversion: 30  Estimate Abduction: 50  Estimated LLD: 2mm         "

## 2024-07-29 NOTE — PROCEDURES
Large Joint Aspiration/Injection: R greater trochanteric bursa    Date/Time: 7/29/2024 8:40 AM    Performed by: Adarsh Clark MD  Authorized by: Adarsh Clark MD    Consent Done?:  Yes (Verbal)  Indications:  Arthritis and pain  Prep: patient was prepped and draped in usual sterile fashion      Local anesthesia used?: Yes    Anesthesia:  Local infiltration  Local anesthetic:  Topical anesthetic and lidocaine 1% without epinephrine    Details:  Needle Size:  22 G  Location:  Hip  Site:  R greater trochanteric bursa  Medications:  40 mg triamcinolone acetonide 40 mg/mL  Patient tolerance:  Patient tolerated the procedure well with no immediate complications

## 2024-08-23 ENCOUNTER — OFFICE VISIT (OUTPATIENT)
Dept: ORTHOPEDICS | Facility: CLINIC | Age: 84
End: 2024-08-23
Payer: MEDICARE

## 2024-08-23 VITALS — BODY MASS INDEX: 33.53 KG/M2 | WEIGHT: 213.63 LBS | HEIGHT: 67 IN

## 2024-08-23 DIAGNOSIS — Z96.641 STATUS POST RIGHT HIP REPLACEMENT: ICD-10-CM

## 2024-08-23 DIAGNOSIS — M70.61 TROCHANTERIC BURSITIS OF RIGHT HIP: Primary | ICD-10-CM

## 2024-08-23 PROCEDURE — 99999 PR PBB SHADOW E&M-EST. PATIENT-LVL III: CPT | Mod: PBBFAC,,, | Performed by: ORTHOPAEDIC SURGERY

## 2024-08-23 NOTE — PROGRESS NOTES
Ortho Hip Follow Up Note    PCP: Kalin Cobos MD   Referring Provider: No referring provider defined for this encounter.     Assessment:  84 y.o. male status post right Total Hip Primary completed on 5/1/24. Patient has complaints of pain today.  This is in contrast to his three-week visit of me which he stated his preop pain was gone, he was on a cane.  In subsequent follow up physical therapy notes he had 0/10 pain and was last seen over a month ago.  He was reporting improvement in his motion of his hip.  Previously, he is reporting a lateral based right hip pain that has been present since surgery.  It is start-up pain, difficulty lying in his side, tenderness to touch. Clinically he does not have any pain with internal or external rotation of his hip and he maintains good range of motion.  He did have significant tenderness over his greater trochanter with the extent into the gluteal tendons.  I think this would be more consistent with a trochanteric bursitis or gluteal tendinitis.  For this we discussed oral medications, injection, more physical therapy.  He wants to do a home exercise program and an injection. That treatment reduced his pain dramatically. He has some start up pain but otherwise is doing well now with regard to the hip.    Plan:  Follow up 1 year  Future Radiographs Indicated at next visit: yes    Pain Management: Tylenol  Anticoagulation: None  Wound Care: None    Patient Reassurance:   Post-operative course discussed with patient. Patient reassured and supported. All questions answered.    ACTIVE PROBLEM LIST  Patient Active Problem List   Diagnosis    Impaired functional mobility, balance, gait, and endurance         HPI:  Michael Lennon presents today for a post-op visit.    STATUS POST:  left Total Hip Primary  BMI: Body mass index is 33.46 kg/m².    Post operative recovery was complicated by: None    Patient rates his condition as improving. Pleased with surgical outcome to date.  "    Functional Assessment:  Completed PT  Functional Difficulties:  Arising from chair  Pain Medication:  Non-Narcotic  Anticoagulation: None    EXAM: POST OP HIP    right POST-OPERATIVE HIP    Ht 5' 7" (1.702 m)   Wt 96.9 kg (213 lb 10 oz)   BMI 33.46 kg/m²     Skin:  Appropriate post op appearance, No evidence of erythema, warmth, discharge, or drainage, and Incision clean/dry/intact  Range of Motion: Pain Free  TTP over greater trochanter   Neurovascular Status: Sensation intact in Sural, Saphenous, SPN, DPN and Tibial nerve distribution. 5 out of 5 strength in hip flexion, knee flexion/extension, ankle plantarflexion/dorsiflexion. 2+ dorsalis pedis    Hip Imaging:  Implants are well fixed. There is no evidence of loosening. No dislocation.   Estimated Anteversion: 30  Estimate Abduction: 50  Estimated LLD: 2mm           "

## 2025-04-02 DIAGNOSIS — Z96.641 STATUS POST RIGHT HIP REPLACEMENT: Primary | ICD-10-CM

## 2025-05-01 ENCOUNTER — OFFICE VISIT (OUTPATIENT)
Dept: ORTHOPEDICS | Facility: CLINIC | Age: 85
End: 2025-05-01
Payer: MEDICARE

## 2025-05-01 ENCOUNTER — APPOINTMENT (OUTPATIENT)
Dept: RADIOLOGY | Facility: HOSPITAL | Age: 85
End: 2025-05-01
Attending: ORTHOPAEDIC SURGERY
Payer: MEDICARE

## 2025-05-01 VITALS — WEIGHT: 213.63 LBS | HEIGHT: 67 IN | BODY MASS INDEX: 33.53 KG/M2

## 2025-05-01 DIAGNOSIS — Z96.641 STATUS POST RIGHT HIP REPLACEMENT: ICD-10-CM

## 2025-05-01 DIAGNOSIS — Z96.641 STATUS POST RIGHT HIP REPLACEMENT: Primary | ICD-10-CM

## 2025-05-01 PROCEDURE — 1159F MED LIST DOCD IN RCRD: CPT | Mod: CPTII,S$GLB,, | Performed by: ORTHOPAEDIC SURGERY

## 2025-05-01 PROCEDURE — 1101F PT FALLS ASSESS-DOCD LE1/YR: CPT | Mod: CPTII,S$GLB,, | Performed by: ORTHOPAEDIC SURGERY

## 2025-05-01 PROCEDURE — 73502 X-RAY EXAM HIP UNI 2-3 VIEWS: CPT | Mod: TC,FY,PN,RT

## 2025-05-01 PROCEDURE — 1126F AMNT PAIN NOTED NONE PRSNT: CPT | Mod: CPTII,S$GLB,, | Performed by: ORTHOPAEDIC SURGERY

## 2025-05-01 PROCEDURE — 73502 X-RAY EXAM HIP UNI 2-3 VIEWS: CPT | Mod: 26,RT,, | Performed by: RADIOLOGY

## 2025-05-01 PROCEDURE — 3288F FALL RISK ASSESSMENT DOCD: CPT | Mod: CPTII,S$GLB,, | Performed by: ORTHOPAEDIC SURGERY

## 2025-05-01 PROCEDURE — 99212 OFFICE O/P EST SF 10 MIN: CPT | Mod: S$GLB,,, | Performed by: ORTHOPAEDIC SURGERY

## 2025-05-01 PROCEDURE — 99999 PR PBB SHADOW E&M-EST. PATIENT-LVL III: CPT | Mod: PBBFAC,,, | Performed by: ORTHOPAEDIC SURGERY

## 2025-05-01 NOTE — PROGRESS NOTES
Ortho Hip Follow Up Note    PCP: Kalin Cobos MD   Referring Provider: No referring provider defined for this encounter.     Assessment:  84 y.o. male status post right Total Hip Primary completed on 5/1/24. Patient here for 1 year post op. Previously, he is reporting a lateral based right hip pain.  It was start-up pain, difficulty lying in his side, tenderness to touch. Clinically this pain improved with HEP and an injection. He does not have any pain with internal or external rotation of his hip and he maintains good range of motion.  He reports for the 1st time in many years that he has no pain.    Plan:  Follow up PRN  Future Radiographs Indicated at next visit: no    Pain Management: Tylenol PRN  Anticoagulation: None  Wound Care: None    Patient Reassurance:   Post-operative course discussed with patient. Patient reassured and supported. All questions answered.    ACTIVE PROBLEM LIST  Patient Active Problem List   Diagnosis    Impaired functional mobility, balance, gait, and endurance         HPI:  Michael Lennon presents today for a post-op visit.    STATUS POST:  left Total Hip Primary  BMI: There is no height or weight on file to calculate BMI.    Post operative recovery was complicated by: None    Patient rates his condition as improving. Pleased with surgical outcome to date.     Functional Assessment:  Completed PT  Functional Difficulties:  None  Pain Medication:  Non-Narcotic  Anticoagulation: None    EXAM: POST OP HIP    right POST-OPERATIVE HIP    There were no vitals taken for this visit.    Skin:  Appropriate post op appearance, No evidence of erythema, warmth, discharge, or drainage, and Incision clean/dry/intact  Range of Motion: Pain Free  Neurovascular Status: Sensation intact in Sural, Saphenous, SPN, DPN and Tibial nerve distribution. 5 out of 5 strength in hip flexion, knee flexion/extension, ankle plantarflexion/dorsiflexion. 2+ dorsalis pedis    Hip Imaging:  Implants are well fixed.  There is no evidence of loosening. No dislocation.   Estimated Anteversion: 30  Estimate Abduction: 50  Estimated LLD: 2mm

## (undated) DEVICE — ELECTRODE REM PLYHSV RETURN 9

## (undated) DEVICE — BLANKET UPPER BODY 78.7X29.9IN

## (undated) DEVICE — DRAPE INCISE IOBAN 2 23X33IN

## (undated) DEVICE — SOL NACL IRR 3000ML

## (undated) DEVICE — PULSAVAC ZIMMER

## (undated) DEVICE — SUPPORT ULNA NERVE PROTECTOR

## (undated) DEVICE — HOOD FLYTE PEELWY STERISHIELD

## (undated) DEVICE — KIT CHECKPOINT TIBIAL

## (undated) DEVICE — NDL SPINAL 18GX3.5 SPINOCAN

## (undated) DEVICE — NDL ELECTRODE EXTENDED 6

## (undated) DEVICE — SUT STRATAFIX PDS PLS 45CM

## (undated) DEVICE — DRAPE STERI U-SHAPED 47X51IN

## (undated) DEVICE — SYS CLSR DERMABOND PRINEO 22CM

## (undated) DEVICE — ELECTRODE BLADE TEFLON 6

## (undated) DEVICE — SUT VICRYL PLUS 2-0 CT1 18

## (undated) DEVICE — SUT ETHIBOND XTRA 5 V-37 GR

## (undated) DEVICE — SYR 50CC LL

## (undated) DEVICE — DRESSING COVER AQUACEL AG SURG

## (undated) DEVICE — DRESSING MEPILEX AG 8X20IN

## (undated) DEVICE — Device

## (undated) DEVICE — SUT STRATAFIX PGAPCL 3 FS-1

## (undated) DEVICE — BLADE SAW OSC ME-92 COATED

## (undated) DEVICE — DRAPE HIP PCH 112X137X89IN

## (undated) DEVICE — APPLICATOR CHLORAPREP ORN 26ML

## (undated) DEVICE — SPONGE COTTON TRAY 4X4IN

## (undated) DEVICE — DRAPE THREE-QTR REINF 53X77IN